# Patient Record
Sex: FEMALE | Race: WHITE | NOT HISPANIC OR LATINO
[De-identification: names, ages, dates, MRNs, and addresses within clinical notes are randomized per-mention and may not be internally consistent; named-entity substitution may affect disease eponyms.]

---

## 2019-11-22 ENCOUNTER — APPOINTMENT (OUTPATIENT)
Dept: ORTHOPEDIC SURGERY | Facility: CLINIC | Age: 79
End: 2019-11-22
Payer: MEDICARE

## 2019-11-22 VITALS
DIASTOLIC BLOOD PRESSURE: 74 MMHG | HEART RATE: 81 BPM | WEIGHT: 155 LBS | BODY MASS INDEX: 27.46 KG/M2 | HEIGHT: 63 IN | TEMPERATURE: 98.2 F | SYSTOLIC BLOOD PRESSURE: 148 MMHG

## 2019-11-22 DIAGNOSIS — Z78.9 OTHER SPECIFIED HEALTH STATUS: ICD-10-CM

## 2019-11-22 PROCEDURE — 73030 X-RAY EXAM OF SHOULDER: CPT | Mod: 26,RT

## 2019-11-22 PROCEDURE — 99203 OFFICE O/P NEW LOW 30 MIN: CPT

## 2019-11-27 NOTE — HISTORY OF PRESENT ILLNESS
[de-identified] : The patient comes in today with complaints of right shoulder pain.  The patient states it has been going on for quite a while, but now she just had a big move from one house to another and she has overused it.  The patient states the pain is sharp.  The patient describes the pain as constant. [6] : a current pain level of 6/10 [de-identified] : Using arm to lift items [7] : the ailment interference is 7/10 [] : No

## 2019-11-27 NOTE — DISCUSSION/SUMMARY
[de-identified] : At this time, due to right shoulder bursitis, I recommended ice and elevation.  The patient will return back to the office in 2 weeks.\par

## 2019-11-27 NOTE — PHYSICAL EXAM
[Normal] : Gait: normal [de-identified] : Right Shoulder:  \par Shoulder: Range of Motion in Degrees:	\par 	                                  Claimant:	Normal:	\par Abduction (Active)	                  180	               180 degrees	\par Abduction (Passive)	  180	               180 degrees	\par Forward elevation (Active):	  180	               180 degrees	\par Forward elevation (Passive):	  180	               180 degrees	\par External rotation (Active):	   45	               45 degrees	\par External rotation (Passive):	   45	               45 degrees	\par Internal rotation (Active):	   L-1	               L-1	\par Internal rotation (Passive):	   L-1	               L-1	\par  \par No motor weakness to internal rotation, external rotation or abduction in the scapular plane.  Negative crank test.  Negative O’Jr’s test.  Negative Speed’s test. Negative Yergason’s test.  Negative cross arm test.  No tenderness to palpation at the AC joint. Positive Hawkin’s sign.  Positive Neer’s sign. Negative apprehension. Negative sulcus sign.  No gross neurological or vascular deficits distally.  Skin is intact.  No rashes, scars or lesions. 2+ radial and ulnar pulses. No extra-articular swelling or tenderness.\par \par Left Shoulder: \par Shoulder: Range of Motion in Degrees:   	\par    	                        Claimant:  	Normal:  	\par Abduction (Active)  	180  	180 degrees  	\par Abduction (Passive)  	180  	180 degrees  	\par Forward elevation (Active):  	180  	180 degrees  	\par Forward elevation (Passive):  180  	180 degrees  	\par External rotation (Active):  	45  	45 degrees  	\par External rotation (Passive):  	45  	45 degrees  	\par Internal rotation (Active):  	L-1  	L-1  	\par Internal rotation (Passive):  	L-1  	L-1  	\par \par No motor weakness to internal rotation, external rotation or abduction in the scapular plane.  Negative crank test.  Negative O’Jr’s test.  Negative Speed’s test. Negative Yergason’s test.  Negative cross arm test.  No tenderness to palpation at the AC joint. Negative Hawkin’s sign.  Negative Neer’s sign.  Negative apprehension. Negative sulcus sign.  No gross neurological or vascular deficits distally.  Skin is intact.  No rashes, scars or lesions.  2+ radial and ulnar pulses. No extra-articular swelling or tenderness.\par   [de-identified] : Appearance:  Well developed, well-nourished female in no acute distress.\par   [de-identified] : Radiographs, two views of the right shoulder, no acute fractures or dislocations.\par

## 2019-11-27 NOTE — REVIEW OF SYSTEMS
[Joint Pain] : joint pain [Cough] : cough [Heartburn] : heartburn [Urinary Frequency] : urinary frequency [Incontinence] : incontinence [Negative] : Heme/Lymph

## 2019-11-27 NOTE — ADDENDUM
[FreeTextEntry1] : This note was written by Cortez Vincent on 11/27/2019, acting as a scribe for BRANDON QUIÑONEZ, TAMMIE/JUAN MANUEL PA

## 2019-12-05 PROBLEM — M75.51 BURSITIS OF RIGHT SHOULDER: Status: ACTIVE | Noted: 2019-11-27

## 2019-12-06 ENCOUNTER — APPOINTMENT (OUTPATIENT)
Dept: ORTHOPEDIC SURGERY | Facility: CLINIC | Age: 79
End: 2019-12-06
Payer: MEDICARE

## 2019-12-06 VITALS
DIASTOLIC BLOOD PRESSURE: 80 MMHG | BODY MASS INDEX: 28 KG/M2 | HEIGHT: 63 IN | HEART RATE: 84 BPM | TEMPERATURE: 98.2 F | SYSTOLIC BLOOD PRESSURE: 151 MMHG | WEIGHT: 158 LBS

## 2019-12-06 DIAGNOSIS — M75.51 BURSITIS OF RIGHT SHOULDER: ICD-10-CM

## 2019-12-06 PROCEDURE — 99212 OFFICE O/P EST SF 10 MIN: CPT

## 2019-12-10 NOTE — ADDENDUM
[FreeTextEntry1] : This note was written by Zuly Gramajo on 12/10/2019 acting as scribe for Azalea Velasco, JENNIFERR/JUAN MANUEL, PA.\par

## 2019-12-10 NOTE — PHYSICAL EXAM
[Normal] : Gait: normal [de-identified] : Right shoulder:\par Shoulder: Range of Motion in Degrees:   	\par    	                        Claimant:          Normal:  	\par Abduction (Active)  	180  	180 degrees  	\par Abduction (Passive)  	180  	180 degrees  	\par Forward elevation (Active):  	180  	180 degrees  	\par Forward elevation (Passive):  180  	180 degrees  	\par External rotation (Active):  	45  	45 degrees  	\par External rotation (Passive):  	45  	45 degrees  	\par Internal rotation (Active):  	L-1  	L-1  	\par Internal rotation (Passive):  	L-1  	L-1  	\par \par No motor weakness to internal rotation, external rotation or abduction in the scapular plane.  Negative crank test.  Negative O’Jr’s test.  Negative Speed’s test. Negative Yergason’s test.  Negative cross arm test.  No tenderness to palpation at the AC joint. Negative Hawkin’s sign.  Negative Neer’s sign.  Negative apprehension. Negative sulcus sign.  No gross neurological or vascular deficits distally.  Skin is intact.  No rashes, scars or lesions.  2+ radial and ulnar pulses. No extra-articular swelling or tenderness.  [de-identified] : Station: normal [de-identified] : Appearance: Well-developed, well-nourished female  in no acute distress.

## 2019-12-10 NOTE — DISCUSSION/SUMMARY
[de-identified] : The patient presents with impingement, right shoulder (resolved).  The patient is just going to do some physical therapy at this time just to get the strength back.  She will return to the office as needed. \par \par The patient has been advised that their blood pressure today was outside normal ranges and the patient was instructed accordingly. Our Blood Pressure Monitoring Sheet with instructions was given to the patient.\par

## 2019-12-10 NOTE — HISTORY OF PRESENT ILLNESS
[de-identified] : Mary Grace comes in today with bursitis of the right shoulder.   The patient states that she had the injection two weeks ago and she is feeling excellent.

## 2021-07-20 DIAGNOSIS — M54.2 CERVICALGIA: ICD-10-CM

## 2021-08-04 ENCOUNTER — TRANSCRIPTION ENCOUNTER (OUTPATIENT)
Age: 81
End: 2021-08-04

## 2021-08-09 ENCOUNTER — EMERGENCY (EMERGENCY)
Facility: HOSPITAL | Age: 81
LOS: 0 days | Discharge: HOME | End: 2021-08-09
Attending: EMERGENCY MEDICINE
Payer: MEDICARE

## 2021-08-09 VITALS — HEIGHT: 63 IN | WEIGHT: 175.05 LBS

## 2021-08-09 VITALS
DIASTOLIC BLOOD PRESSURE: 92 MMHG | RESPIRATION RATE: 16 BRPM | TEMPERATURE: 98 F | HEART RATE: 68 BPM | SYSTOLIC BLOOD PRESSURE: 154 MMHG | OXYGEN SATURATION: 98 %

## 2021-08-09 DIAGNOSIS — Z88.6 ALLERGY STATUS TO ANALGESIC AGENT: ICD-10-CM

## 2021-08-09 DIAGNOSIS — R10.9 UNSPECIFIED ABDOMINAL PAIN: ICD-10-CM

## 2021-08-09 DIAGNOSIS — N20.0 CALCULUS OF KIDNEY: ICD-10-CM

## 2021-08-09 DIAGNOSIS — Z88.1 ALLERGY STATUS TO OTHER ANTIBIOTIC AGENTS STATUS: ICD-10-CM

## 2021-08-09 LAB
ALBUMIN SERPL ELPH-MCNC: 4 G/DL — SIGNIFICANT CHANGE UP (ref 3.3–5)
ALP SERPL-CCNC: 95 U/L — SIGNIFICANT CHANGE UP (ref 40–120)
ALT FLD-CCNC: 32 U/L — SIGNIFICANT CHANGE UP (ref 12–78)
ANION GAP SERPL CALC-SCNC: 4 MMOL/L — LOW (ref 5–17)
APPEARANCE UR: CLEAR — SIGNIFICANT CHANGE UP
AST SERPL-CCNC: 24 U/L — SIGNIFICANT CHANGE UP (ref 15–37)
BASOPHILS # BLD AUTO: 0.04 K/UL — SIGNIFICANT CHANGE UP (ref 0–0.2)
BASOPHILS NFR BLD AUTO: 0.7 % — SIGNIFICANT CHANGE UP (ref 0–2)
BILIRUB SERPL-MCNC: 0.7 MG/DL — SIGNIFICANT CHANGE UP (ref 0.2–1.2)
BILIRUB UR-MCNC: NEGATIVE — SIGNIFICANT CHANGE UP
BUN SERPL-MCNC: 13 MG/DL — SIGNIFICANT CHANGE UP (ref 7–23)
CALCIUM SERPL-MCNC: 9.5 MG/DL — SIGNIFICANT CHANGE UP (ref 8.5–10.1)
CHLORIDE SERPL-SCNC: 107 MMOL/L — SIGNIFICANT CHANGE UP (ref 96–108)
CO2 SERPL-SCNC: 28 MMOL/L — SIGNIFICANT CHANGE UP (ref 22–31)
COLOR SPEC: YELLOW — SIGNIFICANT CHANGE UP
CREAT SERPL-MCNC: 0.72 MG/DL — SIGNIFICANT CHANGE UP (ref 0.5–1.3)
DIFF PNL FLD: NEGATIVE — SIGNIFICANT CHANGE UP
EOSINOPHIL # BLD AUTO: 0.22 K/UL — SIGNIFICANT CHANGE UP (ref 0–0.5)
EOSINOPHIL NFR BLD AUTO: 3.6 % — SIGNIFICANT CHANGE UP (ref 0–6)
GLUCOSE SERPL-MCNC: 93 MG/DL — SIGNIFICANT CHANGE UP (ref 70–99)
GLUCOSE UR QL: NEGATIVE MG/DL — SIGNIFICANT CHANGE UP
HCT VFR BLD CALC: 41.3 % — SIGNIFICANT CHANGE UP (ref 34.5–45)
HGB BLD-MCNC: 13.9 G/DL — SIGNIFICANT CHANGE UP (ref 11.5–15.5)
IMM GRANULOCYTES NFR BLD AUTO: 0.3 % — SIGNIFICANT CHANGE UP (ref 0–1.5)
KETONES UR-MCNC: NEGATIVE — SIGNIFICANT CHANGE UP
LEUKOCYTE ESTERASE UR-ACNC: NEGATIVE — SIGNIFICANT CHANGE UP
LIDOCAIN IGE QN: 186 U/L — SIGNIFICANT CHANGE UP (ref 73–393)
LYMPHOCYTES # BLD AUTO: 1.79 K/UL — SIGNIFICANT CHANGE UP (ref 1–3.3)
LYMPHOCYTES # BLD AUTO: 29.2 % — SIGNIFICANT CHANGE UP (ref 13–44)
MCHC RBC-ENTMCNC: 32 PG — SIGNIFICANT CHANGE UP (ref 27–34)
MCHC RBC-ENTMCNC: 33.7 GM/DL — SIGNIFICANT CHANGE UP (ref 32–36)
MCV RBC AUTO: 94.9 FL — SIGNIFICANT CHANGE UP (ref 80–100)
MONOCYTES # BLD AUTO: 0.68 K/UL — SIGNIFICANT CHANGE UP (ref 0–0.9)
MONOCYTES NFR BLD AUTO: 11.1 % — SIGNIFICANT CHANGE UP (ref 2–14)
NEUTROPHILS # BLD AUTO: 3.37 K/UL — SIGNIFICANT CHANGE UP (ref 1.8–7.4)
NEUTROPHILS NFR BLD AUTO: 55.1 % — SIGNIFICANT CHANGE UP (ref 43–77)
NITRITE UR-MCNC: NEGATIVE — SIGNIFICANT CHANGE UP
PH UR: 6 — SIGNIFICANT CHANGE UP (ref 5–8)
PLATELET # BLD AUTO: 319 K/UL — SIGNIFICANT CHANGE UP (ref 150–400)
POTASSIUM SERPL-MCNC: 3.9 MMOL/L — SIGNIFICANT CHANGE UP (ref 3.5–5.3)
POTASSIUM SERPL-SCNC: 3.9 MMOL/L — SIGNIFICANT CHANGE UP (ref 3.5–5.3)
PROT SERPL-MCNC: 7.6 GM/DL — SIGNIFICANT CHANGE UP (ref 6–8.3)
PROT UR-MCNC: NEGATIVE MG/DL — SIGNIFICANT CHANGE UP
RBC # BLD: 4.35 M/UL — SIGNIFICANT CHANGE UP (ref 3.8–5.2)
RBC # FLD: 12.7 % — SIGNIFICANT CHANGE UP (ref 10.3–14.5)
SODIUM SERPL-SCNC: 139 MMOL/L — SIGNIFICANT CHANGE UP (ref 135–145)
SP GR SPEC: 1.01 — SIGNIFICANT CHANGE UP (ref 1.01–1.02)
UROBILINOGEN FLD QL: NEGATIVE MG/DL — SIGNIFICANT CHANGE UP
WBC # BLD: 6.12 K/UL — SIGNIFICANT CHANGE UP (ref 3.8–10.5)
WBC # FLD AUTO: 6.12 K/UL — SIGNIFICANT CHANGE UP (ref 3.8–10.5)

## 2021-08-09 PROCEDURE — 83690 ASSAY OF LIPASE: CPT

## 2021-08-09 PROCEDURE — 99284 EMERGENCY DEPT VISIT MOD MDM: CPT

## 2021-08-09 PROCEDURE — G1004: CPT

## 2021-08-09 PROCEDURE — 85025 COMPLETE CBC W/AUTO DIFF WBC: CPT

## 2021-08-09 PROCEDURE — 74176 CT ABD & PELVIS W/O CONTRAST: CPT

## 2021-08-09 PROCEDURE — 74176 CT ABD & PELVIS W/O CONTRAST: CPT | Mod: 26,ME

## 2021-08-09 PROCEDURE — 81003 URINALYSIS AUTO W/O SCOPE: CPT

## 2021-08-09 PROCEDURE — 36415 COLL VENOUS BLD VENIPUNCTURE: CPT

## 2021-08-09 PROCEDURE — 87086 URINE CULTURE/COLONY COUNT: CPT

## 2021-08-09 PROCEDURE — 80053 COMPREHEN METABOLIC PANEL: CPT

## 2021-08-09 PROCEDURE — 99284 EMERGENCY DEPT VISIT MOD MDM: CPT | Mod: 25

## 2021-08-09 RX ORDER — TAMSULOSIN HYDROCHLORIDE 0.4 MG/1
1 CAPSULE ORAL
Qty: 7 | Refills: 0
Start: 2021-08-09 | End: 2021-08-15

## 2021-08-09 RX ORDER — SODIUM CHLORIDE 9 MG/ML
1000 INJECTION INTRAMUSCULAR; INTRAVENOUS; SUBCUTANEOUS ONCE
Refills: 0 | Status: COMPLETED | OUTPATIENT
Start: 2021-08-09 | End: 2021-08-09

## 2021-08-09 RX ADMIN — SODIUM CHLORIDE 1000 MILLILITER(S): 9 INJECTION INTRAMUSCULAR; INTRAVENOUS; SUBCUTANEOUS at 11:49

## 2021-08-09 NOTE — ED ADULT TRIAGE NOTE - CHIEF COMPLAINT QUOTE
pt presents to Ed with complaints of right flank pain since yesterday. pt endorses hx of kidney stones and states it feels the same as previous ones.

## 2021-08-09 NOTE — ED STATDOCS - CLINICAL SUMMARY MEDICAL DECISION MAKING FREE TEXT BOX
pt with hx of renal colic and presents with R flank pain starting today. check labs, urine and CT to make diagnosis of renal colic.

## 2021-08-09 NOTE — ED STATDOCS - CARE PROVIDER_API CALL
Andrei Ruano)  Urology  [address], [address] [place] [address]  Phone: [phone]  Fax: [phone]  Follow Up Time:

## 2021-08-09 NOTE — ED ADULT NURSE NOTE - OBJECTIVE STATEMENT
Pt reports that she has been having right flank pain worsening over past day. Pt reports hx of kidney stones in past.

## 2021-08-09 NOTE — ED STATDOCS - PROGRESS NOTE DETAILS
82 yo female with a PMH of kidney stones 5 years ago presents with R sided flank pain radiating to the R abdomen since last night. Pt states the symptoms feel similar to her prior kidney stones. Pt had some dysuria 1 week ago, seen at urgent care, had a negative UA, and was d/c home on abx for a sinus infection. Dysuria has resolved prior to her back pain. Will check labs, UA, CT, reeval. -Jose Abrams PA-C Labs and UA unremarkable. CT showing 1 mm stone. WIll d/c with flomax and give strainer with urology f/u. -Jose Abrams PA-C

## 2021-08-09 NOTE — ED STATDOCS - PATIENT PORTAL LINK FT
You can access the FollowMyHealth Patient Portal offered by [facility] by registering at the following website: [email or web address]. By joining [employer]’s FollowMyHealth portal, you will also be able to view your health information using other applications (apps) compatible with our system.

## 2021-08-09 NOTE — ED STATDOCS - OBJECTIVE STATEMENT
80 y/o F with a PMHx of kidney stones presents to the ED c/o R flank pain x yesterday and the pain subsided. Woke up today and states the pain returned but is worsened today which prompted her ED visit. Pt states the pain feels similar to when she has had kidney stones prior. Pt states she is currently being treated for a sinus infection and fluid in her lungs. c/o dysuria which self resolved. Denies N/V. Pt went to [facility] last week for a sinus infection. last No OP urologist. Non smoker. COVID vaccinated.

## 2021-08-10 LAB
CULTURE RESULTS: SIGNIFICANT CHANGE UP
SPECIMEN SOURCE: SIGNIFICANT CHANGE UP

## 2021-08-12 PROBLEM — N20.0 CALCULUS OF KIDNEY: Chronic | Status: ACTIVE | Noted: 2021-08-09

## 2021-08-19 ENCOUNTER — APPOINTMENT (OUTPATIENT)
Dept: UROLOGY | Facility: CLINIC | Age: 81
End: 2021-08-19

## 2021-08-23 ENCOUNTER — APPOINTMENT (OUTPATIENT)
Dept: UROLOGY | Facility: CLINIC | Age: 81
End: 2021-08-23
Payer: MEDICARE

## 2021-08-23 VITALS
BODY MASS INDEX: 28 KG/M2 | HEIGHT: 63 IN | HEART RATE: 77 BPM | SYSTOLIC BLOOD PRESSURE: 134 MMHG | OXYGEN SATURATION: 96 % | TEMPERATURE: 98.2 F | DIASTOLIC BLOOD PRESSURE: 82 MMHG | WEIGHT: 158 LBS | RESPIRATION RATE: 16 BRPM

## 2021-08-23 DIAGNOSIS — M54.9 DORSALGIA, UNSPECIFIED: ICD-10-CM

## 2021-08-23 PROCEDURE — 99204 OFFICE O/P NEW MOD 45 MIN: CPT

## 2021-08-23 NOTE — PHYSICAL EXAM
[General Appearance - Well Developed] : well developed [General Appearance - Well Nourished] : well nourished [Normal Appearance] : normal appearance [Well Groomed] : well groomed [General Appearance - In No Acute Distress] : no acute distress [Edema] : no peripheral edema [Respiration, Rhythm And Depth] : normal respiratory rhythm and effort [Exaggerated Use Of Accessory Muscles For Inspiration] : no accessory muscle use [Abdomen Soft] : soft [Abdomen Tenderness] : non-tender [Abdomen Mass (___ Cm)] : no abdominal mass palpated [Abdomen Hernia] : no hernia was discovered [Costovertebral Angle Tenderness] : no ~M costovertebral angle tenderness [Urethral Meatus] : normal urethra [External Female Genitalia] : normal external genitalia [Vagina] : normal vaginal exam [Normal Station and Gait] : the gait and station were normal for the patient's age [FreeTextEntry1] : tenderness in back right upper - paraspinous [] : no rash [No Focal Deficits] : no focal deficits [Oriented To Time, Place, And Person] : oriented to person, place, and time [Affect] : the affect was normal [Mood] : the mood was normal [Not Anxious] : not anxious [Cervical Lymph Nodes Enlarged Posterior Bilaterally] : posterior cervical [Cervical Lymph Nodes Enlarged Anterior Bilaterally] : anterior cervical [Supraclavicular Lymph Nodes Enlarged Bilaterally] : supraclavicular

## 2021-08-23 NOTE — REVIEW OF SYSTEMS
[Eyesight Problems] : eyesight problems [Cough] : cough [Constipation] : constipation [Date of last menstrual period ____] : date of last menstrual period: [unfilled] [History of kidney stones] : history of kidney stones [Wake up at night to urinate  How many times?  ___] : wakes up to urinate [unfilled] times during the night [Strong urge to urinate] : strong urge to urinate [Negative] : Heme/Lymph [Recent Weight Loss (___ Lbs)] : recent [unfilled] ~Ulb weight loss [Heartburn] : heartburn [see HPI] : see HPI [Leakage of urine with straining, coughing, laughing] : leakage of urine with straining, coughing, laughing

## 2021-08-23 NOTE — HISTORY OF PRESENT ILLNESS
[FreeTextEntry1] : patient with 2 year hx of inc rafael fontana , jing poise pads-- 6 /day \par no dysuria or hematurai or INC\par good flow and feels empty after void \par no meds tired \par not sexually active\par ++ water intake and bowel OK \par chronic cough since getting second COVID vaccine shot

## 2021-08-23 NOTE — ASSESSMENT
[FreeTextEntry1] : right back pan \par images of ct scan discussed\par right LP complex cyst - simple - 5-6 cm \par right mp 1.7 cmplex cyst -- rec repeat KAYLA 6m --referral given \par 1 mm stone right LP - not likley cause of back pioan with NO right PN strandng of kidney to suggest passage of stone \par patient feels better when restng on back \par ? sxs related to spinal process--hx of osteopenia -? compresion fx\par seen in [facility] for couhg , luts ( inc ) and righ back pain \par labs showed normal white count 6, creat 0.7 and neg urine \par as for INC for past 2 years- discussed meds--she declines\par agrees to UDS- jennifer scheduled

## 2021-08-28 ENCOUNTER — NON-APPOINTMENT (OUTPATIENT)
Age: 81
End: 2021-08-28

## 2021-08-28 ENCOUNTER — EMERGENCY (EMERGENCY)
Facility: HOSPITAL | Age: 81
LOS: 0 days | Discharge: HOME | End: 2021-08-28
Attending: EMERGENCY MEDICINE
Payer: MEDICARE

## 2021-08-28 VITALS
SYSTOLIC BLOOD PRESSURE: 141 MMHG | DIASTOLIC BLOOD PRESSURE: 97 MMHG | TEMPERATURE: 99 F | HEART RATE: 92 BPM | OXYGEN SATURATION: 97 % | RESPIRATION RATE: 18 BRPM

## 2021-08-28 VITALS — WEIGHT: 175.05 LBS | HEIGHT: 63 IN

## 2021-08-28 DIAGNOSIS — Z72.89 OTHER PROBLEMS RELATED TO LIFESTYLE: ICD-10-CM

## 2021-08-28 DIAGNOSIS — Z88.1 ALLERGY STATUS TO OTHER ANTIBIOTIC AGENTS STATUS: ICD-10-CM

## 2021-08-28 DIAGNOSIS — M54.9 DORSALGIA, UNSPECIFIED: ICD-10-CM

## 2021-08-28 DIAGNOSIS — Z87.442 PERSONAL HISTORY OF URINARY CALCULI: ICD-10-CM

## 2021-08-28 DIAGNOSIS — M54.6 PAIN IN THORACIC SPINE: ICD-10-CM

## 2021-08-28 DIAGNOSIS — Z88.6 ALLERGY STATUS TO ANALGESIC AGENT: ICD-10-CM

## 2021-08-28 DIAGNOSIS — R10.9 UNSPECIFIED ABDOMINAL PAIN: ICD-10-CM

## 2021-08-28 DIAGNOSIS — K59.00 CONSTIPATION, UNSPECIFIED: ICD-10-CM

## 2021-08-28 DIAGNOSIS — K62.5 HEMORRHAGE OF ANUS AND RECTUM: ICD-10-CM

## 2021-08-28 DIAGNOSIS — K59.03 DRUG INDUCED CONSTIPATION: ICD-10-CM

## 2021-08-28 DIAGNOSIS — G89.29 OTHER CHRONIC PAIN: ICD-10-CM

## 2021-08-28 DIAGNOSIS — R01.1 CARDIAC MURMUR, UNSPECIFIED: ICD-10-CM

## 2021-08-28 LAB
ALBUMIN SERPL ELPH-MCNC: 3.9 G/DL — SIGNIFICANT CHANGE UP (ref 3.3–5)
ALP SERPL-CCNC: 91 U/L — SIGNIFICANT CHANGE UP (ref 40–120)
ALT FLD-CCNC: 30 U/L — SIGNIFICANT CHANGE UP (ref 12–78)
ANION GAP SERPL CALC-SCNC: 3 MMOL/L — LOW (ref 5–17)
APPEARANCE UR: CLEAR — SIGNIFICANT CHANGE UP
AST SERPL-CCNC: 21 U/L — SIGNIFICANT CHANGE UP (ref 15–37)
BASOPHILS # BLD AUTO: 0.04 K/UL — SIGNIFICANT CHANGE UP (ref 0–0.2)
BASOPHILS NFR BLD AUTO: 0.6 % — SIGNIFICANT CHANGE UP (ref 0–2)
BILIRUB SERPL-MCNC: 0.4 MG/DL — SIGNIFICANT CHANGE UP (ref 0.2–1.2)
BILIRUB UR-MCNC: NEGATIVE — SIGNIFICANT CHANGE UP
BUN SERPL-MCNC: 10 MG/DL — SIGNIFICANT CHANGE UP (ref 7–23)
CALCIUM SERPL-MCNC: 9.1 MG/DL — SIGNIFICANT CHANGE UP (ref 8.5–10.1)
CHLORIDE SERPL-SCNC: 107 MMOL/L — SIGNIFICANT CHANGE UP (ref 96–108)
CO2 SERPL-SCNC: 27 MMOL/L — SIGNIFICANT CHANGE UP (ref 22–31)
COLOR SPEC: YELLOW — SIGNIFICANT CHANGE UP
CREAT SERPL-MCNC: 0.78 MG/DL — SIGNIFICANT CHANGE UP (ref 0.5–1.3)
DIFF PNL FLD: NEGATIVE — SIGNIFICANT CHANGE UP
EOSINOPHIL # BLD AUTO: 0.75 K/UL — HIGH (ref 0–0.5)
EOSINOPHIL NFR BLD AUTO: 10.4 % — HIGH (ref 0–6)
GLUCOSE SERPL-MCNC: 67 MG/DL — LOW (ref 70–99)
GLUCOSE UR QL: NEGATIVE MG/DL — SIGNIFICANT CHANGE UP
HCT VFR BLD CALC: 41.1 % — SIGNIFICANT CHANGE UP (ref 34.5–45)
HGB BLD-MCNC: 13.6 G/DL — SIGNIFICANT CHANGE UP (ref 11.5–15.5)
IMM GRANULOCYTES NFR BLD AUTO: 0.4 % — SIGNIFICANT CHANGE UP (ref 0–1.5)
KETONES UR-MCNC: NEGATIVE — SIGNIFICANT CHANGE UP
LACTATE SERPL-SCNC: 1.6 MMOL/L — SIGNIFICANT CHANGE UP (ref 0.7–2)
LEUKOCYTE ESTERASE UR-ACNC: NEGATIVE — SIGNIFICANT CHANGE UP
LIDOCAIN IGE QN: 186 U/L — SIGNIFICANT CHANGE UP (ref 73–393)
LYMPHOCYTES # BLD AUTO: 2.12 K/UL — SIGNIFICANT CHANGE UP (ref 1–3.3)
LYMPHOCYTES # BLD AUTO: 29.4 % — SIGNIFICANT CHANGE UP (ref 13–44)
MCHC RBC-ENTMCNC: 31.4 PG — SIGNIFICANT CHANGE UP (ref 27–34)
MCHC RBC-ENTMCNC: 33.1 GM/DL — SIGNIFICANT CHANGE UP (ref 32–36)
MCV RBC AUTO: 94.9 FL — SIGNIFICANT CHANGE UP (ref 80–100)
MONOCYTES # BLD AUTO: 0.9 K/UL — SIGNIFICANT CHANGE UP (ref 0–0.9)
MONOCYTES NFR BLD AUTO: 12.5 % — SIGNIFICANT CHANGE UP (ref 2–14)
NEUTROPHILS # BLD AUTO: 3.36 K/UL — SIGNIFICANT CHANGE UP (ref 1.8–7.4)
NEUTROPHILS NFR BLD AUTO: 46.7 % — SIGNIFICANT CHANGE UP (ref 43–77)
NITRITE UR-MCNC: NEGATIVE — SIGNIFICANT CHANGE UP
PH UR: 7 — SIGNIFICANT CHANGE UP (ref 5–8)
PLATELET # BLD AUTO: 301 K/UL — SIGNIFICANT CHANGE UP (ref 150–400)
POTASSIUM SERPL-MCNC: 4.1 MMOL/L — SIGNIFICANT CHANGE UP (ref 3.5–5.3)
POTASSIUM SERPL-SCNC: 4.1 MMOL/L — SIGNIFICANT CHANGE UP (ref 3.5–5.3)
PROT SERPL-MCNC: 7.6 GM/DL — SIGNIFICANT CHANGE UP (ref 6–8.3)
PROT UR-MCNC: NEGATIVE MG/DL — SIGNIFICANT CHANGE UP
RBC # BLD: 4.33 M/UL — SIGNIFICANT CHANGE UP (ref 3.8–5.2)
RBC # FLD: 12.6 % — SIGNIFICANT CHANGE UP (ref 10.3–14.5)
SODIUM SERPL-SCNC: 137 MMOL/L — SIGNIFICANT CHANGE UP (ref 135–145)
SP GR SPEC: 1 — LOW (ref 1.01–1.02)
TROPONIN I SERPL-MCNC: <0.015 NG/ML — SIGNIFICANT CHANGE UP (ref 0.01–0.04)
UROBILINOGEN FLD QL: NEGATIVE MG/DL — SIGNIFICANT CHANGE UP
WBC # BLD: 7.2 K/UL — SIGNIFICANT CHANGE UP (ref 3.8–10.5)
WBC # FLD AUTO: 7.2 K/UL — SIGNIFICANT CHANGE UP (ref 3.8–10.5)

## 2021-08-28 PROCEDURE — 71045 X-RAY EXAM CHEST 1 VIEW: CPT | Mod: 26

## 2021-08-28 PROCEDURE — 80053 COMPREHEN METABOLIC PANEL: CPT

## 2021-08-28 PROCEDURE — 85025 COMPLETE CBC W/AUTO DIFF WBC: CPT

## 2021-08-28 PROCEDURE — 83690 ASSAY OF LIPASE: CPT

## 2021-08-28 PROCEDURE — 83605 ASSAY OF LACTIC ACID: CPT

## 2021-08-28 PROCEDURE — 99284 EMERGENCY DEPT VISIT MOD MDM: CPT

## 2021-08-28 PROCEDURE — 84484 ASSAY OF TROPONIN QUANT: CPT

## 2021-08-28 PROCEDURE — 71045 X-RAY EXAM CHEST 1 VIEW: CPT

## 2021-08-28 PROCEDURE — 36415 COLL VENOUS BLD VENIPUNCTURE: CPT

## 2021-08-28 PROCEDURE — 96360 HYDRATION IV INFUSION INIT: CPT | Mod: XU

## 2021-08-28 PROCEDURE — 99284 EMERGENCY DEPT VISIT MOD MDM: CPT | Mod: 25

## 2021-08-28 PROCEDURE — 74177 CT ABD & PELVIS W/CONTRAST: CPT

## 2021-08-28 PROCEDURE — 87086 URINE CULTURE/COLONY COUNT: CPT

## 2021-08-28 PROCEDURE — 74177 CT ABD & PELVIS W/CONTRAST: CPT | Mod: 26,MA

## 2021-08-28 PROCEDURE — 81003 URINALYSIS AUTO W/O SCOPE: CPT

## 2021-08-28 RX ORDER — SODIUM CHLORIDE 9 MG/ML
1000 INJECTION INTRAMUSCULAR; INTRAVENOUS; SUBCUTANEOUS ONCE
Refills: 0 | Status: COMPLETED | OUTPATIENT
Start: 2021-08-28 | End: 2021-08-28

## 2021-08-28 RX ADMIN — SODIUM CHLORIDE 1000 MILLILITER(S): 9 INJECTION INTRAMUSCULAR; INTRAVENOUS; SUBCUTANEOUS at 18:24

## 2021-08-28 RX ADMIN — SODIUM CHLORIDE 1000 MILLILITER(S): 9 INJECTION INTRAMUSCULAR; INTRAVENOUS; SUBCUTANEOUS at 19:25

## 2021-08-28 NOTE — ED STATDOCS - PROGRESS NOTE DETAILS
signed Kym Brantley PA-C Pt seen initially in intake by Dr. Aguiar.   81F c/o abd pain, constipation x 1 week. pt is having some abd distention and notes her stools are soft and thin. +flatus. Mild nausea, no vomiting or fever. abdomen soft, non-tender. REctal exam soft brown stool, guiac negative Lot [id number] 2/28/22 QC pass. Exam chaperoned by Kenia. Plan labs, UA, CT. Pt agrees with plan of  care. No PMD or GI. PMH GERD. not on anticoagulation. signed Kym Brantley PA-C   Labs notable for mild hypoglycemia. Pt alert, NAD. pleasant, conversant and smiling. Pt given crackers in ED. No significant findings on CT. Pt plans to go home and try enema. Also recommend OTC stool softeners or laxatives. return precautions given. outpt f/u GI and PMD (pt has neither). Pt feeling well at DC, agrees with DC and plan of care. Pt ambulates with ease unassisted in ED.

## 2021-08-28 NOTE — ED STATDOCS - NS ED ROS FT
Review of Systems:  	•	CONSTITUTIONAL: no fever  	•	SKIN: no rash  	•	RESPIRATORY: no shortness of breath  	•	CARDIAC: no chest pain  	•	GI:  +abd pain, no nausea, no vomiting, no diarrhea  	•	GENITO-URINARY:  no dysuria, +constipation   	•	MUSCULOSKELETAL:  +back pain  	•	NEUROLOGIC: no weakness  	•	ALLERGY: no rhinorrhea  	•	PSYSCHIATRIC: appropriate concern about symptoms

## 2021-08-28 NOTE — ED ADULT TRIAGE NOTE - CHIEF COMPLAINT QUOTE
Pt ambulatory for constipation and right sided back pain. Pt report she was seen here on 8/9 and dx with kidney stone. Pt has been taking flomax since then and reports that's when symptoms began.

## 2021-08-28 NOTE — ED STATDOCS - ATTENDING CONTRIBUTION TO CARE
I, Siva Aguiar MD,  performed the initial face to face bedside interview with this patient regarding history of present illness, review of symptoms and relevant past medical, social and family history.  I completed an independent physical examination.  I was the initial provider who evaluated this patient. I have signed out the follow up of any pending tests (i.e. labs, radiological studies) to the ACP.  The ACP will complete the work up, interpret tests, administer medications if necessary and reassess the patient for an appropriate disposition.  If questions arise the ACP is expected to contact me for consultation. In the current work-flow I usually don't get to speak to nor reassess patients for final disposition once I sign the case out to the ACP (Unless otherwise specifically documented by me).

## 2021-08-28 NOTE — ED STATDOCS - PATIENT PORTAL LINK FT
You can access the FollowMyHealth Patient Portal offered by [facility] by registering at the following website: [email or web address]. By joining [employer]’s FollowMyHealth portal, you will also be able to view your health information using other applications (apps) compatible with our system.

## 2021-08-28 NOTE — ED STATDOCS - CLINICAL SUMMARY MEDICAL DECISION MAKING FREE TEXT BOX
chronic back 1 month likely muscular abd pain constipation x 2 weeks 1 episode of blood in toilet will guaiac exam CT

## 2021-08-28 NOTE — ED STATDOCS - CARE PROVIDERS DIRECT ADDRESSES
[email or web address]

## 2021-08-28 NOTE — ED STATDOCS - OBJECTIVE STATEMENT
Pertinent HPI/PMH/PSH/FHx/SHx and Review of Systems contained within  HPI:  Patient p/w CC new and worsening constipation, abd pain x 2 weeks ago and chronic R sided back pain x 1 month. Pt was 8/9 had CT showing 1mm stone in kidney d/c w/ Flomax. Pt f/u w/ urology who told pt pain is not due to kidney stone. Pt reports one episode of dark blood in stool. Pt is not on blood thinners.  PMH/PSH relevant for: Kidney stone  ROS negative for: fever, Chest pain, SOB, Nausea, diarrhea, abdominal pain, dysuria    FamilyHx and SocialHx not otherwise contributory

## 2021-08-28 NOTE — ED STATDOCS - NSFOLLOWUPINSTRUCTIONS_ED_ALL_ED_FT
Acute Abdominal Pain    WHAT YOU NEED TO KNOW:    The cause of your abdominal pain may not be found. If a cause is found, treatment will depend on what the cause is.     DISCHARGE INSTRUCTIONS:    Return to the emergency department if:     You vomit blood or cannot stop vomiting.      You have blood in your bowel movement or it looks like tar.       You have bleeding from your rectum.       Your abdomen is larger than usual, more painful, and hard.       You have severe pain in your abdomen.       You stop passing gas and having bowel movements.       You feel weak, dizzy, or faint.    Contact your healthcare provider if:     You have a fever.      You have new signs and symptoms.      Your symptoms do not get better with treatment.       You have questions or concerns about your condition or care.    Medicines may be given to decrease pain, treat an infection, and manage your symptoms. Take your medicine as directed. Call your healthcare provider if you think your medicine is not helping or if you have side effects. Tell him if you are allergic to any medicine. Keep a list of the medicines, vitamins, and herbs you take. Include the amounts, and when and why you take them. Bring the list or the pill bottles to follow-up visits. Carry your medicine list with you in case of an emergency.    Manage your symptoms:     Apply heat on your abdomen for 20 to 30 minutes every 2 hours for as many days as directed. Heat helps decrease pain and muscle spasms.       Manage your stress. Stress may cause abdominal pain. Your healthcare provider may recommend relaxation techniques and deep breathing exercises to help decrease your stress. Your healthcare provider may recommend you talk to someone about your stress or anxiety, such as a counselor or a trusted friend. Get plenty of sleep and exercise regularly.       Limit or do not drink alcohol. Alcohol can make your abdominal pain worse. Ask your healthcare provider if it is safe for you to drink alcohol. Also ask how much is safe for you to drink.       Do not smoke. Nicotine and other chemicals in cigarettes can damage your esophagus and stomach. Ask your healthcare provider for information if you currently smoke and need help to quit. E-cigarettes or smokeless tobacco still contain nicotine. Talk to your healthcare provider before you use these products.     Make changes to the food you eat as directed: Do not eat foods that cause abdominal pain or other symptoms. Eat small meals more often.     Eat more high-fiber foods if you are constipated. High-fiber foods include fruits, vegetables, whole-grain foods, and legumes.       Do not eat foods that cause gas if you have bloating. Examples include broccoli, cabbage, and cauliflower. Do not drink soda or carbonated drinks, because these may also cause gas.       Do not eat foods or drinks that contain sorbitol or fructose if you have diarrhea and bloating. Some examples are fruit juices, candy, jelly, and sugar-free gum.       Do not eat high-fat foods, such as fried foods, cheeseburgers, hot dogs, and desserts.      Limit or do not drink caffeine. Caffeine may make symptoms, such as heart burn or nausea, worse.       Drink plenty of liquids to prevent dehydration from diarrhea or vomiting. Ask your healthcare provider how much liquid to drink each day and which liquids are best for you.     Follow up with your healthcare provider as directed: Write down your questions so you remember to ask them during your visits.     Constipation    WHAT YOU NEED TO KNOW:    Constipation is when you have hard, dry bowel movements, or you go longer than usual between bowel movements.     DISCHARGE INSTRUCTIONS:    Return to the emergency department if:     You have blood in your bowel movements.      You have a fever and abdominal pain with the constipation.    Contact your healthcare provider if:     Your constipation gets worse.       You start to vomit.      You have questions or concerns about your condition or care.    Medicines:     Medicine such as a laxative may help relax and loosen your intestines to help you have a bowel movement. Your provider may recommend you only use laxatives for a short time. Long-term use may make your bowels dependent on the medicine.      Take your medicine as directed. Contact your healthcare provider if you think your medicine is not helping or if you have side effects. Tell him of her if you are allergic to any medicine. Keep a list of the medicines, vitamins, and herbs you take. Include the amounts, and when and why you take them. Bring the list or the pill bottles to follow-up visits. Carry your medicine list with you in case of an emergency.    Relieve constipation:     A suppository may be used to help soften your bowel movements. This may make them easier to pass. A suppository is guided into your rectum through your anus.Suppository for Constipation           An enema is liquid medicine used to clear bowel movement from your rectum. The medicine is put into your rectum through your anus.Enemas         Prevent constipation:     Drink liquids as directed. You may need to drink extra liquids to help soften and move your bowels. Ask how much liquid to drink each day and which liquids are best for you.       Eat high-fiber foods. This may help decrease constipation by adding bulk to your bowel movements. High-fiber foods include fruit, vegetables, whole-grain breads and cereals, and beans. Your healthcare provider or dietitian can help you create a high-fiber meal plan. Your provider may also recommend a fiber supplement if you cannot get enough fiber from food.            Exercise regularly. Regular physical activity can help stimulate your intestines. Ask which exercises are best for you.      Schedule a time each day to have a bowel movement. This may help train your body to have regular bowel movements. Bend forward while you are on the toilet to help move the bowel movement out. Sit on the toilet for at least 10 minutes, even if you do not have a bowel movement.     Follow up with your healthcare provider as directed: Write down your questions so you remember to ask them during your visits.     FOLLOW UP WITH A GASTROENTEROLOGIST AND A PRIMARY DOCTOR THIS WEEK. CALL THE OFFICE TO MAKE AN APPOINTMENT. RETURN TO ER FOR ANY WORSENING SYMPTOMS OR NEW CONCERNS.

## 2021-08-28 NOTE — ED STATDOCS - CARE PROVIDER_API CALL
Alex Cunha)  Gastroenterology; Internal Medicine  [address] [place] [address]  Phone: [phone]  Fax: [phone]  Follow Up Time:

## 2021-08-30 ENCOUNTER — APPOINTMENT (OUTPATIENT)
Dept: FAMILY MEDICINE | Facility: CLINIC | Age: 81
End: 2021-08-30
Payer: MEDICARE

## 2021-08-30 VITALS
WEIGHT: 178 LBS | TEMPERATURE: 97.8 F | DIASTOLIC BLOOD PRESSURE: 86 MMHG | HEIGHT: 63 IN | OXYGEN SATURATION: 97 % | SYSTOLIC BLOOD PRESSURE: 130 MMHG | BODY MASS INDEX: 31.54 KG/M2 | HEART RATE: 77 BPM

## 2021-08-30 PROBLEM — K59.03 DRUG-INDUCED CONSTIPATION: Status: ACTIVE | Noted: 2021-08-30

## 2021-08-30 LAB
CULTURE RESULTS: SIGNIFICANT CHANGE UP
SPECIMEN SOURCE: SIGNIFICANT CHANGE UP

## 2021-08-30 PROCEDURE — 99214 OFFICE O/P EST MOD 30 MIN: CPT

## 2021-08-30 RX ORDER — OMEPRAZOLE 20 MG/1
20 TABLET, DELAYED RELEASE ORAL
Refills: 0 | Status: ACTIVE | COMMUNITY

## 2021-08-30 RX ORDER — CETIRIZINE HYDROCHLORIDE 10 MG/1
10 TABLET, COATED ORAL
Refills: 0 | Status: DISCONTINUED | COMMUNITY
Start: 2021-08-28 | End: 2021-08-30

## 2021-08-30 NOTE — PLAN
[FreeTextEntry1] : We revd 8/28/21 ED labs and abd CT results today and scanned these into her chart. \par \par Recommend she use Mag citrate (1/2 bottle first and if not enough of a response then drink the other half of the bottle) today as well as continue colace and prunes/prune juice and walking/movement to try to get bowels back to usual normal levels. Can consider magnesium citrate 100-400 mg daily for regular use to help keep bowels on track if needed althouhg she notes that constipation is not usually an issue for her. \par \par Ok to use Miralax daily or prn. \par \par Flu vacc revd/recommended. \par \par Recommend supportive care measures incl heat, gentle stretches, Tylenol prn, topical salves, chair yoga etc. Also she will cont PT which she has been doing. Also getting her chronic cough under control should help muscle strain (likely cause of her back/flank pain) resolve. \par \par Revd Montelukast and she would like to give this a try. Will give it a few weeks to see if helps with cough. If needed can add back in nasal steroid spray as well if feels that that mihgt help. Envtal control measures revd as well. If cough is incr/new sxs or if cough not improving with these measures then she should f/u with ENT and/or GI to further eval chronic cough. \par \par She thinks GERD is not likely cause for her cough as she is already on daily PPI med for heartburn (and her GERD has manifested with heartburn as opposed to cough in the past). Disc ok to cont PPI med daily but also ok to do trial of QOD dosing to see if can get by with lower dose of med over time (better for bones etc). If cough worsens when she decreases to QOD dosing she should resume daily dosing. \par \par DEXA revd and recommended and she defers as she notes that she would not want to take meds for bones no matter what the DEXA results showed. She will try to maximize dietary ca+/D/exercise to keep bones healthy. \par \par Reviewed importance of good self care (eg meditation, yoga, adequate rest, regular exercise, magnesium, clean eating etc). She defers on SSRI med trial at this time but can let me know if she changes her mind at any point. She has supportive daughter/family and she will work to try to make sure she gets some time to herself daily/gets some social interactions and respite from her caregiving responsibilities. \par \par She has PE visit scheduled for 11/2021.

## 2021-08-30 NOTE — REVIEW OF SYSTEMS
[Fever] : no fever [Chills] : no chills [Fatigue] : no fatigue [Recent Change In Weight] : ~T no recent weight change [Nasal Discharge] : no nasal discharge [Sore Throat] : no sore throat [Chest Pain] : no chest pain [Postnasal Drip] : no postnasal drip [Palpitations] : no palpitations [Lower Ext Edema] : no lower extremity edema [Shortness Of Breath] : no shortness of breath [Dyspnea on Exertion] : no dyspnea on exertion [Abdominal Pain] : no abdominal pain [Nausea] : no nausea [Diarrhea] : diarrhea [Vomiting] : no vomiting [Dysuria] : no dysuria [Hematuria] : no hematuria [Skin Rash] : no skin rash [Unsteady Walking] : no ataxia [Anxiety] : no anxiety [Depression] : no depression [Easy Bleeding] : no easy bleeding [Easy Bruising] : no easy bruising [FreeTextEntry6] : see HPI [FreeTextEntry7] : GERD, well controlled on daily PPI med, recent constipation related to Flomax use, see HPI [FreeTextEntry9] : OA related joint pain and stiffness. 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

## 2021-08-30 NOTE — PHYSICAL EXAM
[de-identified] : overweight, nontoxic, able to sit comfortably throughout visit and to get on/off exam table and change positions with only minimal assistance,  [de-identified] : +hyperactive (but nl pitch) BS, soft NT abdomen [de-identified] : She has TTP over R lateral lower ribs/flank region in small area (no overlying skin changes or rash in this area)

## 2021-08-30 NOTE — HISTORY OF PRESENT ILLNESS
[FreeTextEntry8] : Ms. uCi is here for eval of right mid/upper back pain. On 8/9/21 had acute onset R mid back pain that felt similar to prior episode of kidney stone so went to ED. Had labs which were reportedly normal and had CT w/o IV contrast and showed 1 mm stone. Sent home with Flomax and hydration and told would pass stone on own. Told to see urol also. Took Flomax and drank a ton of water and got constipated from the flomax (took x 10 days and then stopped).\par \par She had eval with urologist Dr. Clark last week (I revd that note). Urol advised her is not the cause of her back pain and neither are the 2 right sided kidney cysts (5-6 cm simple and 1.7 cm complex); cysts will be followed up with urol with repeat imaging in several months. \par \par Went back to ED 8/28/21 for increased pain and constipation. Had a CT with IV contrast this time and this showed the kidney cysts and also some liver cysts and some mild atelectasis and HH but no signif or severe findings. She was offered pain med which she declined as she knew would make her more constipated. Nothing given in ED to help with constipation. Was advised to take Colace at home. \par \par Took Colace and prune juice yesterday and prune juice again today. Took generic Miralax 3 times last week without results. This AM had loose BM. Has gurgling in her stomach and feels perhaps that more BMs will be coming. +flatus. No abd pains but has some bloating and gassiness feelings. Never has issues with constipation (except once in the past when had surgery and needed opioid med post op). \par \par She notes that she has had chronic cough for past few years. She has never smoked (had second hand smoke exposure in childhood). Saw Dr. Delong and eval was wnl. Had CXR/lung imaging and PFTs and all was wnl. Does not feel like heartburn (but she takes omeprazole daily for heartburn and that controls her heartburn well). Mainly has cough with talking on phone or eating/drinking anything. No post nasal drip. She feels cough is dry cough but daughter feels that it sounds wet/like there is mucous in back of throat at times. Has had cough in [place] but worse since moved to . ?allergies as cause. Trial of nasal steroid spray and oral antihistamine not helpful in the past. Has not tried Montelukast  \par \par She has had R flank pain persistent for 3+ wks. Her PT feels it might be muscle/rib strain due to her freq cough. No rash over area of pain. No midline/spinal column pain. No loss of bowel function or change in bladder function with this pain. No V/D. No blood in stools. She is able to get comfortable at night once she positions herself on her right side or back and she sleeps well \par \par She has a h/o osteopenia and it has been many years since last DEXA test was done. No trauma. No loss of bowel function. She has urinary incontinence (being evaluated by urol for this) but this has been present for several years and does not seem related to acute back pain. No leg weakness or numbness. No fevers.

## 2021-08-30 NOTE — ASSESSMENT
[FreeTextEntry1] : TONI CORNELIUS is a 81 year old female here for eval of new R sided thoracic back pain. She has h/o OA (could be cause of back pain) and osteopenia (possibly osteoporosis) which could be causing sxs if she has a compression Fx but I think that is less likely given location of her pain. SHe has a tiny kidney stone on R side and 2 right kidney cysts but these are not cause of sxs. \par \par DDx incl muscular pain (which is diagnosis I favor based on her exam/location of pain and chronic cough), OA related pain, osteoporosis/compression Fx related pain, ?malignancy (with chronic cough) etc. \par \par She has had recent pulm eval, lung imaging, PFTs and ED eval with labs and CT x 2 in past few weeks and no worrisome cause for her cough or pain has been found. \par \par She also has constipation that seems to be related to recent use of Flomax (now d/c'ed) that has been partially responsive to use of prune juice and colace\par

## 2021-09-07 ENCOUNTER — NON-APPOINTMENT (OUTPATIENT)
Age: 81
End: 2021-09-07

## 2021-09-07 RX ORDER — MONTELUKAST 10 MG/1
10 TABLET, FILM COATED ORAL
Qty: 90 | Refills: 3 | Status: DISCONTINUED | COMMUNITY
Start: 2021-08-30 | End: 2021-09-07

## 2021-10-26 ENCOUNTER — RX RENEWAL (OUTPATIENT)
Age: 81
End: 2021-10-26

## 2021-10-26 ENCOUNTER — APPOINTMENT (OUTPATIENT)
Dept: GASTROENTEROLOGY | Facility: CLINIC | Age: 81
End: 2021-10-26
Payer: MEDICARE

## 2021-10-26 VITALS
HEART RATE: 110 BPM | HEIGHT: 63 IN | SYSTOLIC BLOOD PRESSURE: 153 MMHG | WEIGHT: 176 LBS | BODY MASS INDEX: 31.18 KG/M2 | DIASTOLIC BLOOD PRESSURE: 108 MMHG

## 2021-10-26 DIAGNOSIS — Z01.818 ENCOUNTER FOR OTHER PREPROCEDURAL EXAMINATION: ICD-10-CM

## 2021-10-26 PROCEDURE — 99214 OFFICE O/P EST MOD 30 MIN: CPT

## 2021-10-31 PROBLEM — Z01.818 ENCOUNTER FOR DIAGNOSTIC ENDOSCOPY: Status: RESOLVED | Noted: 2021-10-26 | Resolved: 2021-11-09

## 2021-10-31 NOTE — PHYSICAL EXAM
[General Appearance - Alert] : alert [General Appearance - In No Acute Distress] : in no acute distress [Auscultation Breath Sounds / Voice Sounds] : lungs were clear to auscultation bilaterally [Heart Rate And Rhythm] : heart rate was normal and rhythm regular [Heart Sounds] : normal S1 and S2 [Heart Sounds Gallop] : no gallops [Murmurs] : no murmurs [Heart Sounds Pericardial Friction Rub] : no pericardial rub [Bowel Sounds] : normal bowel sounds [Abdomen Soft] : soft [Abdomen Tenderness] : non-tender [] : no hepato-splenomegaly [Abdomen Mass (___ Cm)] : no abdominal mass palpated [Abnormal Walk] : normal gait [Nail Clubbing] : no clubbing  or cyanosis of the fingernails [Motor Tone] : muscle strength and tone were normal [Musculoskeletal - Swelling] : no joint swelling seen [Oriented To Time, Place, And Person] : oriented to person, place, and time [Impaired Insight] : insight and judgment were intact [Affect] : the affect was normal

## 2021-10-31 NOTE — ASSESSMENT
[FreeTextEntry1] : 82yo female with chronic cough\par \par It is unclear if due to reflux but other causes ruled out so far\par \par will try omeprazole 40 bid famotidine at night empirically

## 2021-10-31 NOTE — HISTORY OF PRESENT ILLNESS
[de-identified] : 82yo female with chronic cough\par \par She has had cough for years \par She has had negative evaluation in the past with egd and ENT\par She was on omeprazole 20mg daily for few weeks with no help\par negative w/u from pulmonary

## 2021-11-11 ENCOUNTER — FORM ENCOUNTER (OUTPATIENT)
Age: 81
End: 2021-11-11

## 2021-11-15 ENCOUNTER — APPOINTMENT (OUTPATIENT)
Dept: FAMILY MEDICINE | Facility: CLINIC | Age: 81
End: 2021-11-15
Payer: MEDICARE

## 2021-11-15 VITALS
BODY MASS INDEX: 31.01 KG/M2 | HEIGHT: 63 IN | OXYGEN SATURATION: 95 % | WEIGHT: 175 LBS | SYSTOLIC BLOOD PRESSURE: 132 MMHG | TEMPERATURE: 96.2 F | DIASTOLIC BLOOD PRESSURE: 80 MMHG | HEART RATE: 87 BPM

## 2021-11-15 DIAGNOSIS — Z82.49 FAMILY HISTORY OF ISCHEMIC HEART DISEASE AND OTHER DISEASES OF THE CIRCULATORY SYSTEM: ICD-10-CM

## 2021-11-15 DIAGNOSIS — M25.511 PAIN IN RIGHT SHOULDER: ICD-10-CM

## 2021-11-15 PROCEDURE — 36415 COLL VENOUS BLD VENIPUNCTURE: CPT

## 2021-11-15 PROCEDURE — G0439: CPT

## 2021-11-15 NOTE — ASSESSMENT
[FreeTextEntry1] : TONI CORNELIUS is a 81 year old female here for a physical exam.  She is also here to follow up on medical issues as noted above.\par

## 2021-11-15 NOTE — REVIEW OF SYSTEMS
[Cough] : cough [Incontinence] : incontinence [Joint Pain] : joint pain [Joint Stiffness] : joint stiffness [Back Pain] : back pain [Discharge] : no discharge [Redness] : no redness [Vision Problems] : no vision problems [Earache] : no earache [Nasal Discharge] : no nasal discharge [Sore Throat] : no sore throat [Postnasal Drip] : postnasal drip [Chest Pain] : no chest pain [Palpitations] : no palpitations [Lower Ext Edema] : no lower extremity edema [Shortness Of Breath] : no shortness of breath [Dyspnea on Exertion] : no dyspnea on exertion [Dysuria] : no dysuria [Hematuria] : no hematuria [Muscle Pain] : muscle pain [Anxiety] : no anxiety [Depression] : no depression [Negative] : Heme/Lymph [FreeTextEntry3] : +macular degeneration. sees retinologist and gets Eyelea injections periodically [FreeTextEntry4] : +chronic post nasal drip which is likely the cause of her chronic cough [FreeTextEntry6] : see HPI [FreeTextEntry8] : and likely pelvic organ prolpase, seeing urol for eval currently  [FreeTextEntry9] : OA related joint pain and stiffness. 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

## 2021-11-15 NOTE — PLAN
[FreeTextEntry1] : Reviewed age-appropriate preventive screening tests with patient. UTD on colonoscopy and no further screening needed. Due for DEXA but she declines rpt test at this point in her life as she states she would not take Rx med for bones no matter what T scores were; she will let me know if she changes her mind. Due for gyn exam as has not had exam since her 50s but she defers on gyn exams at this point in her life (but she is seeing urol for eval and treatment of possible pelvic organ prolapse). Revd pros/cons of mammogram screening at this point in her life. She would like to continue periodic mammo screening (Q2 yrs is what she prefers) and will sched for near future. \par \par She defers on ECG today as had in the hospital a few months ago. \par \par Shingrix revd/recommended. She states she has had both pneumococcal vaccines and I asked her to try to get approximate dates if possible. Also recommended COVID booster and she is considering; she would like to check Ab level as that info would help her in her decision making about booster. \par \par Discussed clean eating (eg Mediterranean style eating plan) and regular exercise/staying as physically active as possible.\par \par R shoulder pain sounds like it is likely biceps tendinosis +/- rotator cuff strain related. She avoids NSAIDs and will cont to do so. Tylenol is ok to use. Ice prn. Stay "in the box" for ROM exercises. Rx for PT given. She defers on imaging and ortho eval at this time but agrees to go for these evaluations if incr/new sxs or if sxs not improving with PT in the near future\par \par Diagnosis, common environmental causes, treatment options for environmental/seasonal/perennial allergies reviewed. Reviewed environmental control measures (keep windows closed, put on AC, shower/rinse off after being outdoors, pillow/mattress covers, HEPA type filters etc), OTC oral antihistamine +/- oral decongestant if tolerated, nasal saline flushes/Neti Pot, +/- can add Mucinex, +/- can add Singulair (Montelukast) to find what combination of meds works best. Cont ipratropium nasal spray as seems to be the med that helps her the most. Disc ok to d/c PPI med and levocetirizine if not signif helping her sxs as likely SE of these over time outweigh benefits at this stage of life and I agree with her decision to use lower doses of these while using them, \par \par Reviewed importance of good self care (eg meditation, yoga, adequate rest, regular exercise, magnesium, clean eating etc).\par \par Next PE in 1 yr.

## 2021-11-15 NOTE — HEALTH RISK ASSESSMENT
[0] : 2) Feeling down, depressed, or hopeless: Not at all (0) [PHQ-2 Negative - No further assessment needed] : PHQ-2 Negative - No further assessment needed [[id number]] : 0

## 2021-11-15 NOTE — PHYSICAL EXAM
[No Acute Distress] : no acute distress [Well Developed] : well developed [Well-Appearing] : well-appearing [Normal Sclera/Conjunctiva] : normal sclera/conjunctiva [EOMI] : extraocular movements intact [Normal Outer Ear/Nose] : the outer ears and nose were normal in appearance [No JVD] : no jugular venous distention [No Lymphadenopathy] : no lymphadenopathy [Supple] : supple [Thyroid Normal, No Nodules] : the thyroid was normal and there were no nodules present [No Respiratory Distress] : no respiratory distress  [Clear to Auscultation] : lungs were clear to auscultation bilaterally [No Accessory Muscle Use] : no accessory muscle use [Normal Rate] : normal rate  [Regular Rhythm] : with a regular rhythm [Normal S1, S2] : normal S1 and S2 [No Murmur] : no murmur heard [No Carotid Bruits] : no carotid bruits [No Varicosities] : no varicosities [Pedal Pulses Present] : the pedal pulses are present [No Edema] : there was no peripheral edema [No Extremity Clubbing/Cyanosis] : no extremity clubbing/cyanosis [Soft] : abdomen soft [Non Tender] : non-tender [Non-distended] : non-distended [No Masses] : no abdominal mass palpated [No HSM] : no HSM [Normal Posterior Cervical Nodes] : no posterior cervical lymphadenopathy [Normal Anterior Cervical Nodes] : no anterior cervical lymphadenopathy [No CVA Tenderness] : no CVA  tenderness [No Spinal Tenderness] : no spinal tenderness [No Joint Swelling] : no joint swelling [Grossly Normal Strength/Tone] : grossly normal strength/tone [No Rash] : no rash [No Skin Lesions] : no skin lesions [Coordination Grossly Intact] : coordination grossly intact [No Focal Deficits] : no focal deficits [Normal Gait] : normal gait [Normal Affect] : the affect was normal [Normal Insight/Judgement] : insight and judgment were intact [Normal Bowel Sounds] : normal bowel sounds [de-identified] : mildly obese, occas cough noted throughout visit, no resp distress [de-identified] : lungs CTA B with regular breathing and with forceful cough [de-identified] : +pain and decreased ROM R shoulder with IR and +TTP over proximal biceps insertion point R shoulder.

## 2021-11-15 NOTE — HISTORY OF PRESENT ILLNESS
[de-identified] : Her last PE was 11/2020\par \par Her last tetanus shot was 11/2020 Tdap\par Pneumovax, Prevnar-- she states she has had both of these vaccines\par Shingrix-- never\par Had flu vaccine this season\par Had Pfizer COVID primary series and is debating booster dose\par \par Her last dentist visit was within past 6 months\par Her last eye doctor appointment was within past year-- gets Eyelea injections for macular degeneration\par \par Her diet is clean/healthful overall\par Exercise-- keeps active around the house but not much formal exercise. \par \par [place],  no further screening required, Dr. Arenas is now her GI specialist \par Her last mammogram was 2019 wnl\par Her last DEXA was >10 yrs ago, declines repeat DEXA at this time as she states she would not take Rx meds no matter what\par Her last gyn exam was in her 50s, she defers on further gyn exams at this point in her life\par \par She is still struggling with chronic cough. Has seen GI and ENT and nutritionist. See prior note for details. Most recently saw Dr. Arenas who Rx'ed omeprazole 40 mg BID but this did not make her feel well (stomach pains, felt not herself). She cut back to 20 mg BID and this is better tolerated but has not yet had any impact on her cough (but she agrees to give it a few weeks longer). She is also taking levcetirizine (5 mg made her too sleepy and gave her dry mouth but she is able to tolerate 1/2 pill daily) and ipratropium nasal spray (this seems to help with the drip) Rx'ed by ENT (Dr. Amaya). She states that cough is annoying but now that she has ruled out serious cause for cough she can tolerate it and live with it knowing it is not dangerous. \par \par Dr. Clark (urol) is doing eval for possible pelvic organ prolapse as she has had increasing pressure in pelvis and feeling of possible prolapse over past few years. \par \par She has h/o R shoulder bursitis and in the past PT helped with that. She has been noticing anterior R shoulder pain and limitation in ROM for past several months. Would like to try PT again for this issue (feels different than her bursitis). Defers on XR and ortho for now knows these are options if not improving with PT. R hand dominant. Thinks sxs started when she had her knee replacement surgery as she was spending a lot of time on electronics during recovery period.

## 2021-11-16 ENCOUNTER — NON-APPOINTMENT (OUTPATIENT)
Age: 81
End: 2021-11-16

## 2021-11-16 LAB
ALBUMIN SERPL ELPH-MCNC: 4.5 G/DL
ALP BLD-CCNC: 88 U/L
ALT SERPL-CCNC: 16 U/L
ANION GAP SERPL CALC-SCNC: 13 MMOL/L
AST SERPL-CCNC: 17 U/L
BILIRUB SERPL-MCNC: 0.6 MG/DL
BUN SERPL-MCNC: 12 MG/DL
CALCIUM SERPL-MCNC: 9.6 MG/DL
CHLORIDE SERPL-SCNC: 104 MMOL/L
CHOLEST SERPL-MCNC: 228 MG/DL
CO2 SERPL-SCNC: 23 MMOL/L
COVID-19 SPIKE DOMAIN ANTIBODY INTERPRETATION: POSITIVE
CREAT SERPL-MCNC: 0.69 MG/DL
GLUCOSE SERPL-MCNC: 91 MG/DL
HDLC SERPL-MCNC: 50 MG/DL
LDLC SERPL CALC-MCNC: 139 MG/DL
MAGNESIUM SERPL-MCNC: 2 MG/DL
NONHDLC SERPL-MCNC: 179 MG/DL
POTASSIUM SERPL-SCNC: 4.1 MMOL/L
PROT SERPL-MCNC: 7.1 G/DL
SARS-COV-2 AB SERPL IA-ACNC: >250 U/ML
SODIUM SERPL-SCNC: 140 MMOL/L
TRIGL SERPL-MCNC: 200 MG/DL
TSH SERPL-ACNC: 1.11 UIU/ML

## 2021-12-03 ENCOUNTER — APPOINTMENT (OUTPATIENT)
Dept: UROLOGY | Facility: CLINIC | Age: 81
End: 2021-12-03
Payer: MEDICARE

## 2021-12-03 ENCOUNTER — OUTPATIENT (OUTPATIENT)
Dept: OUTPATIENT SERVICES | Facility: HOSPITAL | Age: 81
LOS: 1 days | End: 2021-12-03
Payer: MEDICARE

## 2021-12-03 VITALS
HEART RATE: 102 BPM | DIASTOLIC BLOOD PRESSURE: 92 MMHG | SYSTOLIC BLOOD PRESSURE: 155 MMHG | OXYGEN SATURATION: 99 % | TEMPERATURE: 97.8 F

## 2021-12-03 DIAGNOSIS — R35.0 FREQUENCY OF MICTURITION: ICD-10-CM

## 2021-12-03 DIAGNOSIS — N28.1 CYST OF KIDNEY, ACQUIRED: ICD-10-CM

## 2021-12-03 PROCEDURE — 51784 ANAL/URINARY MUSCLE STUDY: CPT | Mod: 26

## 2021-12-03 PROCEDURE — 51741 ELECTRO-UROFLOWMETRY FIRST: CPT

## 2021-12-03 PROCEDURE — 51728 CYSTOMETROGRAM W/VP: CPT | Mod: 26

## 2021-12-03 PROCEDURE — 51741 ELECTRO-UROFLOWMETRY FIRST: CPT | Mod: 26

## 2021-12-03 PROCEDURE — 51797 INTRAABDOMINAL PRESSURE TEST: CPT

## 2021-12-03 PROCEDURE — 51728 CYSTOMETROGRAM W/VP: CPT

## 2021-12-03 PROCEDURE — 51797 INTRAABDOMINAL PRESSURE TEST: CPT | Mod: 26

## 2021-12-03 PROCEDURE — 51784 ANAL/URINARY MUSCLE STUDY: CPT

## 2021-12-08 DIAGNOSIS — R32 UNSPECIFIED URINARY INCONTINENCE: ICD-10-CM

## 2021-12-08 DIAGNOSIS — N28.1 CYST OF KIDNEY, ACQUIRED: ICD-10-CM

## 2021-12-19 ENCOUNTER — FORM ENCOUNTER (OUTPATIENT)
Age: 81
End: 2021-12-19

## 2021-12-20 DIAGNOSIS — Z92.89 PERSONAL HISTORY OF OTHER MEDICAL TREATMENT: ICD-10-CM

## 2021-12-21 ENCOUNTER — NON-APPOINTMENT (OUTPATIENT)
Age: 81
End: 2021-12-21

## 2021-12-26 ENCOUNTER — FORM ENCOUNTER (OUTPATIENT)
Age: 81
End: 2021-12-26

## 2022-01-12 ENCOUNTER — APPOINTMENT (OUTPATIENT)
Dept: FAMILY MEDICINE | Facility: CLINIC | Age: 82
End: 2022-01-12
Payer: MEDICARE

## 2022-01-12 VITALS
HEIGHT: 63 IN | DIASTOLIC BLOOD PRESSURE: 80 MMHG | WEIGHT: 175 LBS | BODY MASS INDEX: 31.01 KG/M2 | OXYGEN SATURATION: 96 % | HEART RATE: 95 BPM | SYSTOLIC BLOOD PRESSURE: 122 MMHG | TEMPERATURE: 97.8 F

## 2022-01-12 DIAGNOSIS — Z11.59 ENCOUNTER FOR SCREENING FOR OTHER VIRAL DISEASES: ICD-10-CM

## 2022-01-12 PROCEDURE — 99213 OFFICE O/P EST LOW 20 MIN: CPT

## 2022-01-12 RX ORDER — LEVOCETIRIZINE DIHYDROCHLORIDE 5 MG/1
5 TABLET ORAL DAILY
Refills: 0 | Status: DISCONTINUED | COMMUNITY
End: 2022-01-12

## 2022-01-12 NOTE — ASSESSMENT
[FreeTextEntry1] : Patient is a 82yo female who presents to the office today complaining of 1 day wet cough, productive of clear sputum, with mild burning sensation in the chest only when coughing.  No chest pain or SOB.  No fevers.  +COVID contact at home ().  Daughter present, pt and daughter requesting CXR.\par \par Cough\par - COVID PCR sent to lab.\par - CXR, RX provided.\par - Albuterol every 4-6 hours as needed.\par - Bromfed for cough.\par - Supportive care including increased fluids, Ibuprofen/Acetaminophen as needed for pain/aches/fevers, OTC cough suppressants/nasal decongestants as needed.\par \par Call the office or go to the ED immediately if you develop new, worsening or concerning symptoms including high fever, severe headache/worst headache of your life, confusion, dizziness/lightheadedness, loss of consciousness, severe chest pain, difficulty breathing, shortness of breath, severe abdominal pain, excessive vomiting/diarrhea, inability to feel/move the extremities, or any other concerning symptoms.\par

## 2022-01-12 NOTE — PHYSICAL EXAM
[No Edema] : there was no peripheral edema [Normal Posterior Cervical Nodes] : no posterior cervical lymphadenopathy [Normal Anterior Cervical Nodes] : no anterior cervical lymphadenopathy [Normal] : no rash [Coordination Grossly Intact] : coordination grossly intact [No Focal Deficits] : no focal deficits [Normal Gait] : normal gait [Normal Affect] : the affect was normal [Normal Insight/Judgement] : insight and judgment were intact [de-identified] : PND

## 2022-01-12 NOTE — HISTORY OF PRESENT ILLNESS
[FreeTextEntry8] : Patient is a 82yo female who presents to the office today complaining of cough.  Patient has a dry cough at baseline, has been seen at this office in the past for the cough, believed to be secondary to possible PND/GERD.  Patient states the cough that she has now is different from her baseline cough.  States for the past 1 day her cough has been wet and productive of clear sputum.  Patient states she has mild burning in the chest only when she coughs, not with exertion or at rest.  Patient denies chest pains or shortness of breath.  Denies leg pain/swelling, recent travel, recent surgeries, or history of blood clots.\par \par Of note, patient's  is sick at home with confirmed COVID+ diagnosis by home test.   has been sick for the past 4 days.\par \par Patient denies history of lung problems, aside from her baseline chronic dry cough.\par +hx PNA long time ago >10 years no hospitalization required\par \par history of congenital heart murmur, resolved.\par Does not follow with Cardiologist\par \par Received COVID vaccine x2 Pfizer 2/5/2021 first.

## 2022-01-12 NOTE — PLAN
[FreeTextEntry1] : See assessment.\par Supportive care.\par COVID PCR sent to lab.\par CXR for change in chronic cough.

## 2022-01-12 NOTE — HEALTH RISK ASSESSMENT

## 2022-01-12 NOTE — REVIEW OF SYSTEMS
[Postnasal Drip] : postnasal drip [Cough] : cough [Negative] : Integumentary [Shortness Of Breath] : no shortness of breath [Wheezing] : no wheezing [Dyspnea on Exertion] : no dyspnea on exertion

## 2022-01-13 ENCOUNTER — NON-APPOINTMENT (OUTPATIENT)
Age: 82
End: 2022-01-13

## 2022-01-13 ENCOUNTER — APPOINTMENT (OUTPATIENT)
Dept: FAMILY MEDICINE | Facility: CLINIC | Age: 82
End: 2022-01-13

## 2022-01-13 NOTE — ED STATDOCS - RESPIRATORY, MLM
Impression: Vitreous degeneration, bilateral: H43.813. OU. Plan: Chronic. Observe. breath sounds clear and equal bilaterally.

## 2022-01-14 LAB — SARS-COV-2 N GENE NPH QL NAA+PROBE: DETECTED

## 2022-01-31 ENCOUNTER — APPOINTMENT (OUTPATIENT)
Dept: UROLOGY | Facility: CLINIC | Age: 82
End: 2022-01-31

## 2022-02-22 ENCOUNTER — NON-APPOINTMENT (OUTPATIENT)
Age: 82
End: 2022-02-22

## 2022-02-22 ENCOUNTER — RX RENEWAL (OUTPATIENT)
Age: 82
End: 2022-02-22

## 2022-02-22 PROBLEM — J30.89 PERENNIAL ALLERGIC RHINITIS: Status: ACTIVE | Noted: 2021-08-28

## 2022-02-23 ENCOUNTER — APPOINTMENT (OUTPATIENT)
Dept: FAMILY MEDICINE | Facility: CLINIC | Age: 82
End: 2022-02-23

## 2022-02-23 DIAGNOSIS — J30.89 OTHER ALLERGIC RHINITIS: ICD-10-CM

## 2022-03-07 ENCOUNTER — NON-APPOINTMENT (OUTPATIENT)
Age: 82
End: 2022-03-07

## 2022-03-14 ENCOUNTER — RX RENEWAL (OUTPATIENT)
Age: 82
End: 2022-03-14

## 2022-03-14 ENCOUNTER — FORM ENCOUNTER (OUTPATIENT)
Age: 82
End: 2022-03-14

## 2022-03-16 ENCOUNTER — APPOINTMENT (OUTPATIENT)
Dept: UROLOGY | Facility: CLINIC | Age: 82
End: 2022-03-16

## 2022-04-07 ENCOUNTER — APPOINTMENT (OUTPATIENT)
Dept: UROLOGY | Facility: CLINIC | Age: 82
End: 2022-04-07

## 2022-04-11 PROBLEM — Z11.59 SCREENING FOR VIRAL DISEASE: Status: ACTIVE | Noted: 2021-11-15

## 2022-04-25 ENCOUNTER — FORM ENCOUNTER (OUTPATIENT)
Age: 82
End: 2022-04-25

## 2022-05-16 ENCOUNTER — APPOINTMENT (OUTPATIENT)
Dept: FAMILY MEDICINE | Facility: CLINIC | Age: 82
End: 2022-05-16

## 2022-05-23 ENCOUNTER — APPOINTMENT (OUTPATIENT)
Dept: UROLOGY | Facility: CLINIC | Age: 82
End: 2022-05-23

## 2022-05-31 ENCOUNTER — FORM ENCOUNTER (OUTPATIENT)
Age: 82
End: 2022-05-31

## 2022-06-05 ENCOUNTER — EMERGENCY (EMERGENCY)
Facility: HOSPITAL | Age: 82
LOS: 0 days | Discharge: HOME | End: 2022-06-06
Attending: EMERGENCY MEDICINE
Payer: MEDICARE

## 2022-06-05 VITALS
HEART RATE: 93 BPM | TEMPERATURE: 98 F | SYSTOLIC BLOOD PRESSURE: 158 MMHG | DIASTOLIC BLOOD PRESSURE: 84 MMHG | OXYGEN SATURATION: 97 % | HEIGHT: 63 IN | RESPIRATION RATE: 18 BRPM

## 2022-06-05 DIAGNOSIS — K21.9 GASTRO-ESOPHAGEAL REFLUX DISEASE WITHOUT ESOPHAGITIS: ICD-10-CM
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
## 2022-06-05 DIAGNOSIS — N20.0 CALCULUS OF KIDNEY: ICD-10-CM

## 2022-06-05 DIAGNOSIS — S89.91XA UNSPECIFIED INJURY OF RIGHT LOWER LEG, INITIAL ENCOUNTER: ICD-10-CM

## 2022-06-05 DIAGNOSIS — Z20.822 CONTACT WITH AND (SUSPECTED) EXPOSURE TO COVID-19: ICD-10-CM

## 2022-06-05 DIAGNOSIS — S22.32XA FRACTURE OF ONE RIB, LEFT SIDE, INITIAL ENCOUNTER FOR CLOSED FRACTURE: ICD-10-CM
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
## 2022-06-05 DIAGNOSIS — Z88.5 ALLERGY STATUS TO NARCOTIC AGENT: ICD-10-CM

## 2022-06-05 DIAGNOSIS — Z88.1 ALLERGY STATUS TO OTHER ANTIBIOTIC AGENTS STATUS: ICD-10-CM

## 2022-06-05 DIAGNOSIS — M79.10 MYALGIA, UNSPECIFIED SITE: ICD-10-CM

## 2022-06-05 DIAGNOSIS — I71.2 THORACIC AORTIC ANEURYSM, WITHOUT RUPTURE: ICD-10-CM
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
## 2022-06-05 PROCEDURE — 99284 EMERGENCY DEPT VISIT MOD MDM: CPT | Mod: FS

## 2022-06-05 PROCEDURE — U0003: CPT

## 2022-06-05 PROCEDURE — 70450 CT HEAD/BRAIN W/O DYE: CPT | Mod: 26,MA

## 2022-06-05 PROCEDURE — 73590 X-RAY EXAM OF LOWER LEG: CPT | Mod: 26,RT

## 2022-06-05 PROCEDURE — 71250 CT THORAX DX C-: CPT | Mod: 26,MA

## 2022-06-05 PROCEDURE — 73590 X-RAY EXAM OF LOWER LEG: CPT | Mod: RT

## 2022-06-05 PROCEDURE — 70450 CT HEAD/BRAIN W/O DYE: CPT | Mod: MA

## 2022-06-05 PROCEDURE — 99284 EMERGENCY DEPT VISIT MOD MDM: CPT | Mod: 25

## 2022-06-05 PROCEDURE — U0005: CPT

## 2022-06-05 PROCEDURE — 71250 CT THORAX DX C-: CPT | Mod: MA

## 2022-06-05 RX ORDER — OXYCODONE AND ACETAMINOPHEN 5; 325 MG/1; MG/1
1 TABLET ORAL ONCE
Refills: 0 | Status: DISCONTINUED | OUTPATIENT
Start: 2022-06-05 | End: 2022-06-05

## 2022-06-05 RX ADMIN — OXYCODONE AND ACETAMINOPHEN 1 TABLET(S): 5; 325 TABLET ORAL at 22:58

## 2022-06-05 NOTE — ED PROVIDER NOTE - PATIENT PORTAL LINK FT
You can access the FollowMyHealth Patient Portal offered by [facility] by registering at the following website: [email or web address]. By joining [facility] [place]’s FollowMyHealth portal, you will also be able to view your health information using other applications (apps) compatible with our system.

## 2022-06-05 NOTE — ED PROVIDER NOTE - MUSCULOSKELETAL, MLM
Spine appears normal, range of motion is not limited, no muscle or joint tenderness. RLE: +Moderate size hematoma right calf consistent with leg contusion. +Tenderness. No bony deformity. NVI. 2+ distal pulses. Straight raise RLE WNL. No hip tenderness. Right knee exam is normal.

## 2022-06-05 NOTE — ED PROVIDER NOTE - CARE PLAN
1 Principal Discharge DX:	Musculoskeletal pain  Secondary Diagnosis:	Thoracic ascending aortic aneurysm

## 2022-06-05 NOTE — ED PROVIDER NOTE - RESPIRATORY, MLM
Breath sounds clear and equal bilaterally. NO w/w/r. CHEST: Equal chest expansion and rise. +Moderate MSK tenderness left lateral ribcage area.

## 2022-06-05 NOTE — ED PROVIDER NOTE - CARE PROVIDER_API CALL
ManvarSingh, Pallavi B (MD)  Vascular Surgery  [address] [place] [address], [address] [place] [address]  Phone: [phone]  Fax: [phone]  Follow Up Time:

## 2022-06-05 NOTE — ED ADULT NURSE NOTE - OBJECTIVE STATEMENT
As per pt,  fell and she tired to catch his fall and fell too. Pt confirms head strike. No LOC. No pain to head. Pain the right lower leg, MD aware

## 2022-06-05 NOTE — ED ADULT TRIAGE NOTE - CHIEF COMPLAINT QUOTE
pt bib ems s/p fall.  tripped and fell onto pt. c/o right hip pain, right wrist pain, right lower leg pain. denies LOC, CP,SOB,N,V,D,F.denies anticoagulants.

## 2022-06-05 NOTE — ED PROVIDER NOTE - PROGRESS NOTE DETAILS
PA: Patient presents with fall injury to right leg and left ribcage, not sure if she hit her head. Will get xrays, CT. Reassess. ~Nando Mason PA-C

## 2022-06-05 NOTE — ED PROVIDER NOTE - OBJECTIVE STATEMENT
PA: Patient is an 82 year old male with PMHx of kidney stone, GERD who presents to [facility] c/o right leg injury, left rib injury from trip and fall in her son's backyard today. Patient may have hit her head but she cannot be certain. No LOC. No neck injury. DENIES hip or knee injury. ~Nando Mason PA-C

## 2022-06-05 NOTE — ED PROVIDER NOTE - NS ED ATTENDING STATEMENT MOD
This was a shared visit with the [facility]. I reviewed and verified the documentation and independently performed the documented:

## 2022-06-05 NOTE — ED PROVIDER NOTE - ENMT NEGATIVE STATEMENT, MLM
?HEAD STRIKE. Ears: no ear pain and no hearing problems. Nose: no nasal congestion and no nasal drainage. Mouth/Throat: no dysphagia, no hoarseness and no throat pain. Neck: no lumps, no pain, no stiffness and no swollen glands.

## 2022-06-05 NOTE — ED PROVIDER NOTE - CLINICAL SUMMARY MEDICAL DECISION MAKING FREE TEXT BOX
CTs and XR negative for acute trauma.  Patient able to ambulate.  Okay for d/c home.  Incidental of mild aneurysmal aorta, f/u with vascular.

## 2022-06-05 NOTE — ED PROVIDER NOTE - NSFOLLOWUPINSTRUCTIONS_ED_ALL_ED_FT
Can take tylenol or motrin for pain as needed.    Rest as much as possible.  Elevate your affected leg to prevent increased swelling.  You can ice the leg as well to help with swelling.    Please make appointment with vascular surgery for follow up on incidental finding of aortic aneurysm.  Yours is mild, however these can enlarge over time and become dangerous.

## 2022-06-05 NOTE — ED ADULT NURSE NOTE - NSIMPLEMENTINTERV_GEN_ALL_ED
Implemented All Universal Safety Interventions:  Delight to call system. Call bell, personal items and telephone within reach. Instruct patient to call for assistance. Room bathroom lighting operational. Non-slip footwear when patient is off stretcher. Physically safe environment: no spills, clutter or unnecessary equipment. Stretcher in lowest position, wheels locked, appropriate side rails in place.

## 2022-06-06 VITALS
HEART RATE: 83 BPM | OXYGEN SATURATION: 96 % | TEMPERATURE: 98 F | SYSTOLIC BLOOD PRESSURE: 151 MMHG | RESPIRATION RATE: 18 BRPM | DIASTOLIC BLOOD PRESSURE: 86 MMHG

## 2022-06-09 ENCOUNTER — FORM ENCOUNTER (OUTPATIENT)
Age: 82
End: 2022-06-09

## 2022-06-10 PROBLEM — K21.9 GASTRO-ESOPHAGEAL REFLUX DISEASE WITHOUT ESOPHAGITIS: Chronic | Status: ACTIVE | Noted: 2022-06-05

## 2022-06-14 ENCOUNTER — NON-APPOINTMENT (OUTPATIENT)
Age: 82
End: 2022-06-14

## 2022-06-15 ENCOUNTER — APPOINTMENT (OUTPATIENT)
Dept: FAMILY MEDICINE | Facility: CLINIC | Age: 82
End: 2022-06-15
Payer: MEDICARE

## 2022-06-15 ENCOUNTER — APPOINTMENT (OUTPATIENT)
Dept: ULTRASOUND IMAGING | Facility: CLINIC | Age: 82
End: 2022-06-15
Payer: MEDICARE

## 2022-06-15 ENCOUNTER — OUTPATIENT (OUTPATIENT)
Dept: OUTPATIENT SERVICES | Facility: HOSPITAL | Age: 82
LOS: 1 days | End: 2022-06-15
Payer: MEDICARE

## 2022-06-15 VITALS
SYSTOLIC BLOOD PRESSURE: 148 MMHG | TEMPERATURE: 97.5 F | BODY MASS INDEX: 31.01 KG/M2 | HEART RATE: 75 BPM | WEIGHT: 175 LBS | OXYGEN SATURATION: 95 % | HEIGHT: 63 IN | DIASTOLIC BLOOD PRESSURE: 86 MMHG

## 2022-06-15 DIAGNOSIS — M79.89 OTHER SPECIFIED SOFT TISSUE DISORDERS: ICD-10-CM

## 2022-06-15 PROCEDURE — 99213 OFFICE O/P EST LOW 20 MIN: CPT

## 2022-06-15 PROCEDURE — 93971 EXTREMITY STUDY: CPT | Mod: 26,RT

## 2022-06-15 PROCEDURE — 93971 EXTREMITY STUDY: CPT

## 2022-06-15 RX ORDER — ALBUTEROL SULFATE 90 UG/1
108 (90 BASE) INHALANT RESPIRATORY (INHALATION)
Qty: 18 | Refills: 0 | Status: DISCONTINUED | COMMUNITY
Start: 2022-02-22 | End: 2022-06-15

## 2022-06-15 RX ORDER — ALBUTEROL SULFATE 90 UG/1
108 (90 BASE) AEROSOL, METERED RESPIRATORY (INHALATION)
Qty: 1 | Refills: 1 | Status: DISCONTINUED | COMMUNITY
Start: 2022-01-12 | End: 2022-06-15

## 2022-06-15 RX ORDER — BROMPHENIRAMINE MALEATE, PSEUDOEPHEDRINE HYDROCHLORIDE, 2; 30; 10 MG/5ML; MG/5ML; MG/5ML
30-2-10 SYRUP ORAL
Qty: 118 | Refills: 0 | Status: DISCONTINUED | COMMUNITY
Start: 2022-01-12 | End: 2022-06-15

## 2022-06-15 NOTE — PHYSICAL EXAM
[No JVD] : no jugular venous distention [Normal] : normal rate, regular rhythm, normal S1 and S2 and no murmur heard [No Edema] : there was no peripheral edema [No Rash] : no rash [Coordination Grossly Intact] : coordination grossly intact [No Focal Deficits] : no focal deficits [Normal Gait] : normal gait [Normal Affect] : the affect was normal [Normal Insight/Judgement] : insight and judgment were intact [de-identified] : ecchymosis, redness, warmth, hardening of the right calf with ecchymosis extending down towards the foot.  full ROM of the ankle/knee.  DP and PT pulses 2+ and strong.  Sensation intact.

## 2022-06-15 NOTE — HISTORY OF PRESENT ILLNESS
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  No acute traumatic injuries.\par X-ray right tib/fib negative for acute findings.  Right knee replacement intact.\par \par Pt reports persistent/worsening pain/swelling/redness of the right calf since incident.\par Has been applying ice without significant improvement.\par Pt able to ambulate without assistance.\par Pt is not on blood thinners.\par pt denies CP, SOB, palpitations.\par no history of blood clots.\par \par Pt was seen by Cardiology, Dr. Shipman, in regards to aneurysmal dilatation of the thoracic aorta.\par Pt was put on beta blocker at that time, has been taking x3 days, feels very tired, not sleeping well, wants to stop it.\par Pt encouraged to reach out to Dr. Shipman about options.

## 2022-06-15 NOTE — ASSESSMENT
[FreeTextEntry1] : Patient is an 83yo female presenting to the office complaining of pain/redness/swelling/ecchymosis of the right posterior calf, persistent/worsening since direct trauma on the area on 6/5/22.\par \par Right Leg Swelling\par - Venous duplex US RLE r/o DVT.\par - Likely hematoma.\par - Rest, heat, elevated, compression (ACE wrap provided).\par - Will take time to heal.\par - Follow up as needed.\par \par Call the office or go to the ED immediately if you develop new, worsening or concerning symptoms including high fever, severe headache/worst headache of your life, confusion, dizziness/lightheadedness, loss of consciousness, severe chest pain, difficulty breathing, shortness of breath, severe abdominal pain, excessive vomiting/diarrhea, inability to feel/move the extremities, or any other concerning symptoms.\par

## 2022-06-16 ENCOUNTER — NON-APPOINTMENT (OUTPATIENT)
Age: 82
End: 2022-06-16

## 2022-06-22 ENCOUNTER — NON-APPOINTMENT (OUTPATIENT)
Age: 82
End: 2022-06-22

## 2022-06-23 ENCOUNTER — INPATIENT (INPATIENT)
Facility: HOSPITAL | Age: 82
LOS: 3 days | Discharge: HOME | DRG: 603 | End: 2022-06-27
Attending: INTERNAL MEDICINE | Admitting: INTERNAL MEDICINE
Payer: MEDICARE

## 2022-06-23 VITALS
HEIGHT: 63 IN | HEART RATE: 115 BPM | OXYGEN SATURATION: 96 % | DIASTOLIC BLOOD PRESSURE: 101 MMHG | SYSTOLIC BLOOD PRESSURE: 148 MMHG | WEIGHT: 171.96 LBS | RESPIRATION RATE: 18 BRPM | TEMPERATURE: 98 F

## 2022-06-23 DIAGNOSIS — L03.115 CELLULITIS OF RIGHT LOWER LIMB: ICD-10-CM

## 2022-06-23 LAB
ALBUMIN SERPL ELPH-MCNC: 3.7 G/DL — SIGNIFICANT CHANGE UP (ref 3.3–5)
ALP SERPL-CCNC: 117 U/L — SIGNIFICANT CHANGE UP (ref 40–120)
ALT FLD-CCNC: 21 U/L — SIGNIFICANT CHANGE UP (ref 12–78)
ANION GAP SERPL CALC-SCNC: 4 MMOL/L — LOW (ref 5–17)
APPEARANCE UR: CLEAR — SIGNIFICANT CHANGE UP
APTT BLD: 30.5 SEC — SIGNIFICANT CHANGE UP (ref 27.5–35.5)
AST SERPL-CCNC: 9 U/L — LOW (ref 15–37)
BASOPHILS # BLD AUTO: 0.03 K/UL — SIGNIFICANT CHANGE UP (ref 0–0.2)
BASOPHILS NFR BLD AUTO: 0.4 % — SIGNIFICANT CHANGE UP (ref 0–2)
BILIRUB SERPL-MCNC: 0.5 MG/DL — SIGNIFICANT CHANGE UP (ref 0.2–1.2)
BILIRUB UR-MCNC: NEGATIVE — SIGNIFICANT CHANGE UP
BUN SERPL-MCNC: 13 MG/DL — SIGNIFICANT CHANGE UP (ref 7–23)
CALCIUM SERPL-MCNC: 9.3 MG/DL — SIGNIFICANT CHANGE UP (ref 8.5–10.1)
CHLORIDE SERPL-SCNC: 106 MMOL/L — SIGNIFICANT CHANGE UP (ref 96–108)
CO2 SERPL-SCNC: 28 MMOL/L — SIGNIFICANT CHANGE UP (ref 22–31)
COLOR SPEC: YELLOW — SIGNIFICANT CHANGE UP
CREAT SERPL-MCNC: 0.82 MG/DL — SIGNIFICANT CHANGE UP (ref 0.5–1.3)
DIFF PNL FLD: NEGATIVE — SIGNIFICANT CHANGE UP
EGFR: 72 ML/MIN/1.73M2 — SIGNIFICANT CHANGE UP
EOSINOPHIL # BLD AUTO: 0.11 K/UL — SIGNIFICANT CHANGE UP (ref 0–0.5)
EOSINOPHIL NFR BLD AUTO: 1.4 % — SIGNIFICANT CHANGE UP (ref 0–6)
GLUCOSE SERPL-MCNC: 134 MG/DL — HIGH (ref 70–99)
GLUCOSE UR QL: NEGATIVE — SIGNIFICANT CHANGE UP
HCT VFR BLD CALC: 40 % — SIGNIFICANT CHANGE UP (ref 34.5–45)
HGB BLD-MCNC: 13.3 G/DL — SIGNIFICANT CHANGE UP (ref 11.5–15.5)
IMM GRANULOCYTES NFR BLD AUTO: 0.5 % — SIGNIFICANT CHANGE UP (ref 0–1.5)
INR BLD: 1.06 RATIO — SIGNIFICANT CHANGE UP (ref 0.88–1.16)
KETONES UR-MCNC: NEGATIVE — SIGNIFICANT CHANGE UP
LACTATE SERPL-SCNC: 1.4 MMOL/L — SIGNIFICANT CHANGE UP (ref 0.7–2)
LEUKOCYTE ESTERASE UR-ACNC: ABNORMAL
LYMPHOCYTES # BLD AUTO: 1.35 K/UL — SIGNIFICANT CHANGE UP (ref 1–3.3)
LYMPHOCYTES # BLD AUTO: 17.2 % — SIGNIFICANT CHANGE UP (ref 13–44)
MCHC RBC-ENTMCNC: 31.4 PG — SIGNIFICANT CHANGE UP (ref 27–34)
MCHC RBC-ENTMCNC: 33.3 GM/DL — SIGNIFICANT CHANGE UP (ref 32–36)
MCV RBC AUTO: 94.3 FL — SIGNIFICANT CHANGE UP (ref 80–100)
MONOCYTES # BLD AUTO: 0.99 K/UL — HIGH (ref 0–0.9)
MONOCYTES NFR BLD AUTO: 12.6 % — SIGNIFICANT CHANGE UP (ref 2–14)
NEUTROPHILS # BLD AUTO: 5.31 K/UL — SIGNIFICANT CHANGE UP (ref 1.8–7.4)
NEUTROPHILS NFR BLD AUTO: 67.9 % — SIGNIFICANT CHANGE UP (ref 43–77)
NITRITE UR-MCNC: NEGATIVE — SIGNIFICANT CHANGE UP
PH UR: 6 — SIGNIFICANT CHANGE UP (ref 5–8)
PLATELET # BLD AUTO: 372 K/UL — SIGNIFICANT CHANGE UP (ref 150–400)
POTASSIUM SERPL-MCNC: 4.2 MMOL/L — SIGNIFICANT CHANGE UP (ref 3.5–5.3)
POTASSIUM SERPL-SCNC: 4.2 MMOL/L — SIGNIFICANT CHANGE UP (ref 3.5–5.3)
PROT SERPL-MCNC: 7.6 GM/DL — SIGNIFICANT CHANGE UP (ref 6–8.3)
PROT UR-MCNC: NEGATIVE — SIGNIFICANT CHANGE UP
PROTHROM AB SERPL-ACNC: 12.3 SEC — SIGNIFICANT CHANGE UP (ref 10.5–13.4)
RBC # BLD: 4.24 M/UL — SIGNIFICANT CHANGE UP (ref 3.8–5.2)
RBC # FLD: 12.6 % — SIGNIFICANT CHANGE UP (ref 10.3–14.5)
SARS-COV-2 RNA SPEC QL NAA+PROBE: SIGNIFICANT CHANGE UP
SODIUM SERPL-SCNC: 138 MMOL/L — SIGNIFICANT CHANGE UP (ref 135–145)
SP GR SPEC: 1.01 — SIGNIFICANT CHANGE UP (ref 1.01–1.02)
UROBILINOGEN FLD QL: 1
WBC # BLD: 7.83 K/UL — SIGNIFICANT CHANGE UP (ref 3.8–10.5)
WBC # FLD AUTO: 7.83 K/UL — SIGNIFICANT CHANGE UP (ref 3.8–10.5)

## 2022-06-23 PROCEDURE — 80202 ASSAY OF VANCOMYCIN: CPT

## 2022-06-23 PROCEDURE — 36415 COLL VENOUS BLD VENIPUNCTURE: CPT

## 2022-06-23 PROCEDURE — 85027 COMPLETE CBC AUTOMATED: CPT

## 2022-06-23 PROCEDURE — 99223 1ST HOSP IP/OBS HIGH 75: CPT

## 2022-06-23 PROCEDURE — 99285 EMERGENCY DEPT VISIT HI MDM: CPT | Mod: FS

## 2022-06-23 PROCEDURE — 93005 ELECTROCARDIOGRAM TRACING: CPT

## 2022-06-23 PROCEDURE — 76882 US LMTD JT/FCL EVL NVASC XTR: CPT | Mod: 26,RT

## 2022-06-23 PROCEDURE — 80048 BASIC METABOLIC PNL TOTAL CA: CPT

## 2022-06-23 RX ORDER — NALOXONE HYDROCHLORIDE 4 MG/.1ML
0.4 SPRAY NASAL ONCE
Refills: 0 | Status: DISCONTINUED | OUTPATIENT
Start: 2022-06-23 | End: 2022-06-27

## 2022-06-23 RX ORDER — VANCOMYCIN HCL 1 G
1000 VIAL (EA) INTRAVENOUS EVERY 12 HOURS
Refills: 0 | Status: DISCONTINUED | OUTPATIENT
Start: 2022-06-23 | End: 2022-06-27

## 2022-06-23 RX ORDER — PIPERACILLIN AND TAZOBACTAM 4; .5 G/20ML; G/20ML
3.38 INJECTION, POWDER, LYOPHILIZED, FOR SOLUTION INTRAVENOUS ONCE
Refills: 0 | Status: COMPLETED | OUTPATIENT
Start: 2022-06-23 | End: 2022-06-23

## 2022-06-23 RX ORDER — SENNA PLUS 8.6 MG/1
2 TABLET ORAL AT BEDTIME
Refills: 0 | Status: DISCONTINUED | OUTPATIENT
Start: 2022-06-23 | End: 2022-06-27

## 2022-06-23 RX ORDER — VANCOMYCIN HCL 1 G
1000 VIAL (EA) INTRAVENOUS ONCE
Refills: 0 | Status: DISCONTINUED | OUTPATIENT
Start: 2022-06-23 | End: 2022-06-23

## 2022-06-23 RX ORDER — OXYCODONE HYDROCHLORIDE 5 MG/1
5 TABLET ORAL EVERY 4 HOURS
Refills: 0 | Status: DISCONTINUED | OUTPATIENT
Start: 2022-06-23 | End: 2022-06-27

## 2022-06-23 RX ORDER — VANCOMYCIN HCL 1 G
1250 VIAL (EA) INTRAVENOUS ONCE
Refills: 0 | Status: COMPLETED | OUTPATIENT
Start: 2022-06-23 | End: 2022-06-23

## 2022-06-23 RX ORDER — PANTOPRAZOLE SODIUM 20 MG/1
40 TABLET, DELAYED RELEASE ORAL
Refills: 0 | Status: DISCONTINUED | OUTPATIENT
Start: 2022-06-23 | End: 2022-06-27

## 2022-06-23 RX ORDER — POLYETHYLENE GLYCOL 3350 17 G/17G
17 POWDER, FOR SOLUTION ORAL DAILY
Refills: 0 | Status: DISCONTINUED | OUTPATIENT
Start: 2022-06-23 | End: 2022-06-27

## 2022-06-23 RX ORDER — OXYCODONE HYDROCHLORIDE 5 MG/1
10 TABLET ORAL EVERY 4 HOURS
Refills: 0 | Status: DISCONTINUED | OUTPATIENT
Start: 2022-06-23 | End: 2022-06-27

## 2022-06-23 RX ORDER — ACETAMINOPHEN 500 MG
1000 TABLET ORAL ONCE
Refills: 0 | Status: COMPLETED | OUTPATIENT
Start: 2022-06-23 | End: 2022-06-23

## 2022-06-23 RX ORDER — ACETAMINOPHEN 500 MG
1000 TABLET ORAL EVERY 8 HOURS
Refills: 0 | Status: DISCONTINUED | OUTPATIENT
Start: 2022-06-23 | End: 2022-06-27

## 2022-06-23 RX ORDER — ONDANSETRON 8 MG/1
4 TABLET, FILM COATED ORAL EVERY 6 HOURS
Refills: 0 | Status: DISCONTINUED | OUTPATIENT
Start: 2022-06-23 | End: 2022-06-27

## 2022-06-23 RX ORDER — PIPERACILLIN AND TAZOBACTAM 4; .5 G/20ML; G/20ML
3.38 INJECTION, POWDER, LYOPHILIZED, FOR SOLUTION INTRAVENOUS EVERY 8 HOURS
Refills: 0 | Status: DISCONTINUED | OUTPATIENT
Start: 2022-06-23 | End: 2022-06-24

## 2022-06-23 RX ADMIN — OXYCODONE HYDROCHLORIDE 5 MILLIGRAM(S): 5 TABLET ORAL at 22:54

## 2022-06-23 RX ADMIN — PIPERACILLIN AND TAZOBACTAM 3.38 GRAM(S): 4; .5 INJECTION, POWDER, LYOPHILIZED, FOR SOLUTION INTRAVENOUS at 16:50

## 2022-06-23 RX ADMIN — OXYCODONE HYDROCHLORIDE 5 MILLIGRAM(S): 5 TABLET ORAL at 23:24

## 2022-06-23 RX ADMIN — PIPERACILLIN AND TAZOBACTAM 200 GRAM(S): 4; .5 INJECTION, POWDER, LYOPHILIZED, FOR SOLUTION INTRAVENOUS at 16:20

## 2022-06-23 RX ADMIN — Medication 1000 MILLIGRAM(S): at 17:53

## 2022-06-23 RX ADMIN — Medication 400 MILLIGRAM(S): at 17:38

## 2022-06-23 RX ADMIN — Medication 166.67 MILLIGRAM(S): at 17:36

## 2022-06-23 NOTE — ED STATDOCS - PROGRESS NOTE DETAILS
81 yo female with a PMH of GERD presents with R calf pain, swelling, and redness. Pt states she had trauma to the RLE approx 18 days ago when her  fell on her, was evaluated, and d/c home. Since then she noticed the calf was hard. Was seen by her PMD Wednesday and had a sono which was negative for a DVT. Was seen by her PT who noticed her leg was red and outlined it with a pen. +malaise and weakness which she said was unusual for her. Will check labs, sono, IV abx and likely admission. -Jose Abrams PA-C Sono showing irregular fluid collection to the R calf. Discussed case with ortho team which states its not an ortho case and more gen surgery. Called the sx resident phone and currently scrubbed in. Left message with the person holding the phone and gave the pt's name. -Jose Abrams PA-C Surgery came to see pt. States no surgical intervention and no drainage at this time and to admit to medicine for iv abx. -Jose Abrams PA-C

## 2022-06-23 NOTE — ED STATDOCS - NS ED ROS FT
Constitutional: No fever or chills  Eyes: No visual changes  HEENT: No throat pain  CV: No chest pain  Resp: No SOB no cough  GI: No abd pain, nausea or vomiting  : No dysuria  MSK: No musculoskeletal pain  Skin: No rash, +abscess on R LE  Neuro: No headache

## 2022-06-23 NOTE — ED STATDOCS - CARE PLAN
1 Principal Discharge DX:	Cellulitis of right lower extremity   Principal Discharge DX:	Cellulitis of right lower extremity  Secondary Diagnosis:	Calf swelling

## 2022-06-23 NOTE — ED ADULT NURSE NOTE - OBJECTIVE STATEMENT
Pt presents to ED for right lower leg edema and redness.  Pt aox3 from home reports tripped and fell and hit brick last week and yesterday noticed leg swelling, redness with migration of redness down right lower leg. Pt reports weakness, denies fevers at this time.  Right calf appears warm, hard, red progressing from knee to ankle.

## 2022-06-23 NOTE — ED STATDOCS - NS ED ATTENDING STATEMENT MOD
I have seen and examined this patient and fully participated in the care of this patient as the teaching attending.  The service was shared with the [facility].  I reviewed and verified the documentation and independently performed the documented:

## 2022-06-23 NOTE — ED STATDOCS - OBJECTIVE STATEMENT
83 y/o female with a PMHx of renal calculi, GERD, and partial knee replacement, presents to the ED c/o R LE pain in setting of possible cellulitis. Pt states  fell on pt and pt's r calf landed on a brick. Went to UC and was prompted to ED. Pt with erythematous and raised skin on RLE. 1 week ago had out-patient sonogram that indicated no blood clot. on Omeprazole for acid reflux. Pt with allergy to Cipro. Not up to date on TDAP. PMD: Dr. Milian.

## 2022-06-23 NOTE — PHARMACOTHERAPY INTERVENTION NOTE - COMMENTS
Medication history complete, [facility] medication with patient [facility]

## 2022-06-23 NOTE — ED STATDOCS - PHYSICAL EXAMINATION
Constitutional: NAD AOx3  Eyes: PERRL EOMI  Head: Normocephalic atraumatic  Mouth: MMM  Cardiac: regular rate and rhythm  Resp: Lungs CTAB  GI: Abd s/nd/nt  Neuro: CN2-12 grossly intact, BUTTS x 4  Skin: No visible rashes anterior R shin yellow green contusion, past calf has area of errythema marked with marker from yesterday ascending past marker with superior aspect indurated and ttp

## 2022-06-23 NOTE — CONSULT NOTE ADULT - SUBJECTIVE AND OBJECTIVE BOX
83 y/o female with a PMHx of renal calculi, GERD, and partial knee replacement, presents to the ED c/o R LE pain in setting of possible cellulitis. Pt states  fell on pt and pt's r calf landed on a brick. Went to  and was prompted to ED. Pt with erythematous and raised skin on RLE. 1 week ago had out-patient sonogram that indicated no blood clot. Denies fevers, chills, chest pain, shortness of breath, nausea, vomiting.    Vital Signs Last 24 Hrs  T(C): 36.8 (23 Jun 2022 13:14), Max: 36.8 (23 Jun 2022 13:14)  T(F): 98.3 (23 Jun 2022 13:14), Max: 98.3 (23 Jun 2022 13:14)  HR: 115 (23 Jun 2022 13:14) (115 - 115)  BP: 148/101 (23 Jun 2022 13:14) (148/101 - 148/101)  BP(mean): 113 (23 Jun 2022 13:14) (113 - 113)  RR: 18 (23 Jun 2022 13:14) (18 - 18)  SpO2: 96% (23 Jun 2022 13:14) (96% - 96%)    Labs:                              13.3   7.83  )-----------( 372      ( 23 Jun 2022 14:23 )             40.0     CBC Full  -  ( 23 Jun 2022 14:23 )  WBC Count : 7.83 K/uL  RBC Count : 4.24 M/uL  Hemoglobin : 13.3 g/dL  Hematocrit : 40.0 %  Platelet Count - Automated : 372 K/uL  Mean Cell Volume : 94.3 fl  Mean Cell Hemoglobin : 31.4 pg  Mean Cell Hemoglobin Concentration : 33.3 gm/dL  Auto Neutrophil # : 5.31 K/uL  Auto Lymphocyte # : 1.35 K/uL  Auto Monocyte # : 0.99 K/uL  Auto Eosinophil # : 0.11 K/uL  Auto Basophil # : 0.03 K/uL  Auto Neutrophil % : 67.9 %  Auto Lymphocyte % : 17.2 %  Auto Monocyte % : 12.6 %  Auto Eosinophil % : 1.4 %  Auto Basophil % : 0.4 %    06-23    138  |  106  |  13  ----------------------------<  134<H>  4.2   |  28  |  0.82    Ca    9.3      23 Jun 2022 14:23    TPro  7.6  /  Alb  3.7  /  TBili  0.5  /  DBili  x   /  AST  9<L>  /  ALT  21  /  AlkPhos  117  06-23    LIVER FUNCTIONS - ( 23 Jun 2022 14:23 )  Alb: 3.7 g/dL / Pro: 7.6 gm/dL / ALK PHOS: 117 U/L / ALT: 21 U/L / AST: 9 U/L / GGT: x           PT/INR - ( 23 Jun 2022 14:23 )   PT: 12.3 sec;   INR: 1.06 ratio         PTT - ( 23 Jun 2022 14:23 )  PTT:30.5 sec    Physical Exam:  Pt is AAOx3  General: Well developed, in no acute distress.   Chest: Lungs clear, no rales, no rhonchi, no wheezes.   Heart: RR, no murmurs, no rubs, no gallops.   Abdomen: Soft, no tenderness, nondistended, no masses, BS normal.    Back: Normal curvature, no tenderness.   Neuro: Physiological, no localizing findings.   Extremities: RLE posterior leg with severe erythema, warm to touch, tender to palpation, no overlying skin necrosis, no visible hematoma, no fluctuance, no notable abscess. sensorimotor intact. pain is not out of proportion to exam.  Extremities: Warm, well perfused, no edema, Pulses intact      ACC: [id number] [facility]                          PROCEDURE DATE:  06/23/2022          INTERPRETATION:  CLINICAL INFORMATION: Rule out abscess. Pain and   erythema in the right calf    TECHNIQUE: A targeted ultrasound of the area of concern in the right calf   was performed utilizing grayscale and color flow Doppler.    COMPARISON: None    FINDINGS: There is an irregular shaped elongated fluid collection   extending from the upper to the mid calf and measuring 9.9 x 2.0 x 2.0   cm. located between 0.9-1.5 cm from the skin surface.    IMPRESSION:  Right calf fluid collection as described.

## 2022-06-23 NOTE — H&P ADULT - HISTORY OF PRESENT ILLNESS
HPI:  82F w/PMH GERD, presents c/o RLE calf pain, swelling and redness x18 days. Initial incident incurred when pt's  tripped and fell on her, as she fell, her calf hit the edge of a brick. They were evaluated in ED at that time w/ no significant findings. 1 week later, she went to see her PA and sent for doppler which reportedly revealed hematoma. Pt, however, continued to have pain and redness, taking 1 oxy at night for pain.  Yesterday, while at PT, she was instructed to come to ED as her leg appeared very inflamed and pain was not improving. Pt denies fever/chills, n/v/d, abd pain, cp, sob, acute cough, dysuria.      In ED, labs ok, LA 1.4, UA neg, HR 100s, evaluated by surgery (note reviewed), given iv zosyn, vanco  RLE doppler There is an irregular shaped elongated fluid collection extending from the upper to the mid calf and measuring 9.9 x 2.0 x 2.0 cm. located between 0.9-1.5 cm from the skin surface.    PAST MEDICAL & SURGICAL HISTORY:  Kidney stones  GERD (gastroesophageal reflux disease)  s/p Partial and TKA left, Rt TKA      Review of Systems:   CONSTITUTIONAL: No fever.  EYES: No eye pain or discharge.  ENMT:  No sinus or throat pain  NECK: No pain or stiffness  RESPIRATORY: No cough, wheezing, chills or hemoptysis; No shortness of breath  CARDIOVASCULAR: No chest pain, palpitations, dizziness, or leg swelling  GASTROINTESTINAL: No abdominal or epigastric pain. No nausea, vomiting, or hematemesis; No diarrhea or constipation. No melena or hematochezia.  GENITOURINARY: No dysuria or incontinence  NEUROLOGICAL: No headaches, memory loss, loss of strength, numbness, or tremors  SKIN: No rashes.  MUSCULOSKELETAL: No joint pain or swelling; No muscle, back, or extremity pain  PSYCHIATRIC: No depression, anxiety, mood swings, or difficulty sleeping    Social History:   ind ADLs  lives w/   denies smoking/etoh    FAMILY HISTORY:  denies any known history    MEDICATIONS  (STANDING):  acetaminophen     Tablet .. 1000 milliGRAM(s) Oral every 8 hours  naloxone Injectable 0.4 milliGRAM(s) IV Push once  pantoprazole    Tablet 40 milliGRAM(s) Oral before breakfast  piperacillin/tazobactam IVPB.. 3.375 Gram(s) IV Intermittent every 6 hours  polyethylene glycol 3350 17 Gram(s) Oral daily  senna 2 Tablet(s) Oral at bedtime  vancomycin  IVPB 1000 milliGRAM(s) IV Intermittent every 12 hours    MEDICATIONS  (PRN):  bisacodyl 5 milliGRAM(s) Oral daily PRN Constipation  ondansetron Injectable 4 milliGRAM(s) IV Push every 6 hours PRN Nausea  oxyCODONE    IR 5 milliGRAM(s) Oral every 4 hours PRN Moderate Pain (4 - 6)  oxyCODONE    IR 10 milliGRAM(s) Oral every 4 hours PRN Severe Pain (7 - 10)      PHYSICAL EXAM:  Vital Signs Last 24 Hrs  T(C): 36.6 (2022 21:06), Max: 36.9 (2022 20:32)  T(F): 97.8 (2022 21:06), Max: 98.5 (2022 20:32)  HR: 90 (2022 21:06) (86 - 115)  BP: 134/80 (2022 21:06) (128/95 - 148/101)  BP(mean): 104 (2022 20:32) (104 - 113)  RR: 16 (2022 21:06) (14 - 18)  SpO2: 95% (2022 21:06) (95% - 96%)  GENERAL: NAD,   HEAD:  Atraumatic, Normocephalic  EYES: EOMI, PERRLA, conjunctiva and sclera clear  ENT: normal hearing, no nasal discharge, throat clear, dentition normal  NECK: Supple, No JVD, no LAD, no thyromegaly   CHEST/LUNG: Clear to auscultation bilaterally; No wheeze, respirations unlabored  HEART: Regular rate and rhythm; No murmurs, rubs, or gallops  ABDOMEN: Soft, Nontender, Nondistended; Bowel sounds present, no HSM  EXTREMITIES:  2+ Peripheral Pulses, No clubbing, cyanosis, or edema  PSYCH: AAOx3, normal behavior  NEUROLOGY: non-focal, sensory and cn 2-12 intact, speech/language intact  SKIN: No visible rashes or lesions ++RLE calf erythema w/ large area of swelling, TTP    LABS:                        13.3   7.83  )-----------( 372      ( 2022 14:23 )             40.0     06-23    138  |  106  |  13  ----------------------------<  134<H>  4.2   |  28  |  0.82    Ca    9.3      2022 14:23    TPro  7.6  /  Alb  3.7  /  TBili  0.5  /  DBili  x   /  AST  9<L>  /  ALT  21  /  AlkPhos  117  06-23    PT/INR - ( 2022 14:23 )   PT: 12.3 sec;   INR: 1.06 ratio         PTT - ( 2022 14:23 )  PTT:30.5 sec      Urinalysis Basic - ( 2022 14:39 )    Color: Yellow / Appearance: Clear / S.010 / pH: x  Gluc: x / Ketone: Negative  / Bili: Negative / Urobili: 1   Blood: x / Protein: Negative / Nitrite: Negative   Leuk Esterase: Trace / RBC: 0-2 /HPF / WBC 3-5   Sq Epi: x / Non Sq Epi: Few / Bacteria: Few        RADIOLOGY & ADDITIONAL TESTS:    Imaging Personally Reviewed:  RLE doppler There is an irregular shaped elongated fluid collection extending from the upper to the mid calf and measuring 9.9 x 2.0 x 2.0 cm. located between 0.9-1.5 cm from the skin surface.    EKG Personally Reviewed:  n/a  Consultant(s) Notes Reviewed:      Care Discussed with Consultants/Other Providers:

## 2022-06-23 NOTE — CONSULT NOTE ADULT - ASSESSMENT
82F with posterior RLE cellulitis with likely underlying hematoma.    No evidence of skin changes indicating necrosis or underlying abscess  recommend IV abx and serial exams of RLE  IVF  trend WBC  if concern develops for underlying abscess please obtain a CT of the right lower extremity  no surgical intervention at this time, please reconsult if necessary    Plan discussed with Dr. Hamm

## 2022-06-23 NOTE — H&P ADULT - ASSESSMENT
82F w/PMH GERD, presents c/o RLE calf pain, swelling and redness x18 days.  In ED, labs ok, LA 1.4, UA neg, HR 100s, evaluated by surgery (note reviewed), given iv zosyn, vanco  RLE doppler There is an irregular shaped elongated fluid collection extending from the upper to the mid calf and measuring 9.9 x 2.0 x 2.0 cm. located between 0.9-1.5 cm from the skin surface.    #cellulitis w/ fluid collection on doppler  - appreciate surgery input  - iv zosyn, vanco  - ID cs  - check cultures, labs, monitor vitals  - prn po analgesics for pain    #PPX- low risk, ambulate

## 2022-06-23 NOTE — ED ADULT NURSE NOTE - NSICDXPASTMEDICALHX_GEN_ALL_CORE_FT
Addended by: GIL MONGE on: 3/19/2020 02:40 PM     Modules accepted: Orders     PAST MEDICAL HISTORY:  GERD (gastroesophageal reflux disease)     Kidney stones

## 2022-06-24 LAB
CULTURE RESULTS: SIGNIFICANT CHANGE UP
SPECIMEN SOURCE: SIGNIFICANT CHANGE UP

## 2022-06-24 PROCEDURE — 93010 ELECTROCARDIOGRAM REPORT: CPT

## 2022-06-24 PROCEDURE — 99233 SBSQ HOSP IP/OBS HIGH 50: CPT

## 2022-06-24 RX ORDER — CEFTRIAXONE 500 MG/1
2000 INJECTION, POWDER, FOR SOLUTION INTRAMUSCULAR; INTRAVENOUS EVERY 24 HOURS
Refills: 0 | Status: DISCONTINUED | OUTPATIENT
Start: 2022-06-24 | End: 2022-06-27

## 2022-06-24 RX ADMIN — Medication 1000 MILLIGRAM(S): at 15:14

## 2022-06-24 RX ADMIN — POLYETHYLENE GLYCOL 3350 17 GRAM(S): 17 POWDER, FOR SOLUTION ORAL at 09:30

## 2022-06-24 RX ADMIN — PIPERACILLIN AND TAZOBACTAM 25 GRAM(S): 4; .5 INJECTION, POWDER, LYOPHILIZED, FOR SOLUTION INTRAVENOUS at 06:55

## 2022-06-24 RX ADMIN — OXYCODONE HYDROCHLORIDE 5 MILLIGRAM(S): 5 TABLET ORAL at 20:19

## 2022-06-24 RX ADMIN — Medication 250 MILLIGRAM(S): at 17:23

## 2022-06-24 RX ADMIN — Medication 30 MILLILITER(S): at 17:23

## 2022-06-24 RX ADMIN — OXYCODONE HYDROCHLORIDE 5 MILLIGRAM(S): 5 TABLET ORAL at 19:49

## 2022-06-24 RX ADMIN — PANTOPRAZOLE SODIUM 40 MILLIGRAM(S): 20 TABLET, DELAYED RELEASE ORAL at 05:33

## 2022-06-24 RX ADMIN — Medication 250 MILLIGRAM(S): at 05:34

## 2022-06-24 RX ADMIN — CEFTRIAXONE 100 MILLIGRAM(S): 500 INJECTION, POWDER, FOR SOLUTION INTRAMUSCULAR; INTRAVENOUS at 15:13

## 2022-06-24 NOTE — CONSULT NOTE ADULT - ASSESSMENT
82F w/PMH GERD, presents c/o RLE calf pain, swelling and redness x18 days. Initial incident incurred when pt's  tripped and fell on her, as she fell, her calf hit the edge of a brick. They were evaluated in ED at that time w/ no significant findings. 1 week later, she went to see her PA and sent for doppler which reportedly revealed hematoma. Pt, however, continued to have pain and redness, taking 1 oxy at night for pain. 6/23 while at PT, she was instructed to come to ED as her leg appeared very inflamed and pain was not improving. Pt denies fever/chills, n/v/d, abd pain, cp, sob, acute cough, dysuria. In ED, labs ok, LA 1.4, UA neg, HR 100s, evaluated by surgery (note reviewed), given iv zosyn, vanco  RLE doppler There is an irregular shaped elongated fluid collection extending from the upper to the mid calf and measuring 9.9 x 2.0 x 2.0 cm. located between 0.9-1.5 cm from the skin surface.     1. RLE hematoma. Superimposed RLE cellulitis. S/p trauma  - agree with IV vancomycin [id number] check trough prior to 4th dose  - change zosyn to rocephin 2gm daily  - continue with abx coverage   - US reviewed  - surgical follow up noted  - tolerating abx well so far; no side effects noted  - reason for abx use and side effects reviewed with patient  - supportive care  - fu cbc    2. other issues - care per medicine     -

## 2022-06-24 NOTE — CONSULT NOTE ADULT - SUBJECTIVE AND OBJECTIVE BOX
Patient is a 82y old  Female who presents with a chief complaint of Leg pain (2022 08:52)    HPI:    82F w/PMH GERD, presents c/o RLE calf pain, swelling and redness x18 days. Initial incident incurred when pt's  tripped and fell on her, as she fell, her calf hit the edge of a brick. They were evaluated in ED at that time w/ no significant findings. 1 week later, she went to see her PA and sent for doppler which reportedly revealed hematoma. Pt, however, continued to have pain and redness, taking 1 oxy at night for pain.  while at PT, she was instructed to come to ED as her leg appeared very inflamed and pain was not improving. Pt denies fever/chills, n/v/d, abd pain, cp, sob, acute cough, dysuria. In ED, labs ok, LA 1.4, UA neg, HR 100s, evaluated by surgery (note reviewed), given iv zosyn, vanco  RLE doppler There is an irregular shaped elongated fluid collection extending from the upper to the mid calf and measuring 9.9 x 2.0 x 2.0 cm. located between 0.9-1.5 cm from the skin surface.         PAST MEDICAL & SURGICAL HISTORY:  Kidney stones  GERD (gastroesophageal reflux disease)  s/p Partial and TKA left, Rt TKA      PMH: as above  PSH: as above  Meds: per reconciliation sheet, noted below  MEDICATIONS  (STANDING):  acetaminophen     Tablet .. 1000 milliGRAM(s) Oral every 8 hours  cefTRIAXone   IVPB 2000 milliGRAM(s) IV Intermittent every 24 hours  naloxone Injectable 0.4 milliGRAM(s) IV Push once  pantoprazole    Tablet 40 milliGRAM(s) Oral before breakfast  polyethylene glycol 3350 17 Gram(s) Oral daily  senna 2 Tablet(s) Oral at bedtime  vancomycin  IVPB 1000 milliGRAM(s) IV Intermittent every 12 hours      Allergies    Cipro (Unknown)    Intolerances    morphine (Vomiting (Severe))    Social: no smoking, no alcohol, no illegal drugs; no recent travel, no exposure to TB  FAMILY HISTORY:     no history of premature cardiovascular disease in first degree relatives    ROS: the patient denies fever, no chills, no HA, no dizziness, no sore throat, no blurry vision, no CP, no palpitations, no SOB, no cough, no abdominal pain, no diarrhea, no N/V, no dysuria,+ leg pain, no claudication, no rash, no joint aches, no rectal pain or bleeding, no night sweats    All other systems reviewed and are negative    Vital Signs Last 24 Hrs  T(C): 36.6 (2022 09:04), Max: 36.9 (2022 20:32)  T(F): 97.9 (2022 09:04), Max: 98.5 (2022 20:32)  HR: 82 (2022 09:04) (82 - 115)  BP: 131/88 (2022 09:04) (128/95 - 148/101)  BP(mean): 104 (2022 20:32) (104 - 113)  RR: 17 (2022 09:04) (14 - 18)  SpO2: 97% (2022 09:04) (95% - 97%)  Daily Height in cm: 160.02 (2022 13:14)    Daily     PE:  Constitutional: NAD   HEENT: NC/AT, EOMI, PERRLA, conjunctivae clear; ears and nose atraumatic; pharynx benign  Neck: supple; thyroid not palpable  Back: no tenderness  Respiratory: respiratory effort normal; clear to auscultation  Cardiovascular: S1S2 regular, no murmurs  Abdomen: soft, not tender, not distended, positive BS; liver and spleen WNL  Genitourinary: no suprapubic tenderness  Lymphatic: no LN palpable  Musculoskeletal: no muscle tenderness, no joint swelling or tenderness  Extremities: no pedal edema R calf hematoma, erythema, warmth, tenderness   Neurological/ Psychiatric: AxOx3, Judgement and insight normal;  moving all extremities  Skin: no rashes; no palpable lesions    Labs: all available labs reviewed                        13.3   7.83  )-----------( 372      ( 2022 14:23 )             40.0     06-23    138  |  106  |  13  ----------------------------<  134<H>  4.2   |  28  |  0.82    Ca    9.3      2022 14:23    TPro  7.6  /  Alb  3.7  /  TBili  0.5  /  DBili  x   /  AST  9<L>  /  ALT  21  /  AlkPhos  117       LIVER FUNCTIONS - ( 2022 14:23 )  Alb: 3.7 g/dL / Pro: 7.6 gm/dL / ALK PHOS: 117 U/L / ALT: 21 U/L / AST: 9 U/L / GGT: x           Urinalysis Basic - ( 2022 14:39 )    Color: Yellow / Appearance: Clear / S.010 / pH: x  Gluc: x / Ketone: Negative  / Bili: Negative / Urobili: 1   Blood: x / Protein: Negative / Nitrite: Negative   Leuk Esterase: Trace / RBC: 0-2 /HPF / WBC 3-5   Sq Epi: x / Non Sq Epi: Few / Bacteria: Few          Radiology: all available radiological tests reviewed  < from: US Extremity Nonvasc Limited, Right (22 @ 15:21) >    ACC: [id number] EXAM:  US NONVASC EXT LTD RT                          PROCEDURE DATE:  2022          INTERPRETATION:  CLINICAL INFORMATION: Rule out abscess. Pain and   erythema in the right calf    TECHNIQUE: A targeted ultrasound of the area of concern in the right calf   was performed utilizing grayscale and color flow Doppler.    COMPARISON: None    FINDINGS: There is an irregular shaped elongated fluid collection   extending from the upper to the mid calf and measuring 9.9 x 2.0 x 2.0   cm. located between 0.9-1.5 cm from the skin surface.    IMPRESSION:  Right calf fluid collection as described.    --- End of Report ---      < end of copied text >  < from: Xray Tibia + Fibula 2 Views, Right (22 @ 23:25) >    ACC: [id number] EXAM:  XR TIB FIB AP LAT 2 VIEWS RT                          PROCEDURE DATE:  2022          INTERPRETATION:  Frontal and lateral views of the right lower leg were   performed for history of pain following fall.    There are no prior studies for comparison.    There is no acute fracture or dislocation. There is a right knee   prosthesis with no sign of hardware loosening, disruption or   displacement. Articulation of the right ankle is grossly unremarkable.   There is no boneerosion or destruction. No penetrating injury or   radiopaque nonsurgical foreign body is seen.    IMPRESSION: No acute fracture or dislocation.  Right knee prosthesis in situ with no signs of disruption.    --- End of Report ---        < end of copied text >    Advanced directives addressed: full resuscitation

## 2022-06-24 NOTE — PATIENT PROFILE ADULT - FALL HARM RISK - RISK INTERVENTIONS
Communicate Fall Risk and Risk Factors to all staff, patient, and family/Monitor for mental status changes/Monitor gait and stability/Reinforce activity limits and safety measures with patient and family/Use of alarms - bed, chair and/or voice tab/Visual Cue: Yellow wristband/Bed in lowest position, wheels locked, appropriate side rails in place/Call bell, personal items and telephone in reach/Instruct patient to call for assistance before getting out of bed or chair/Non-slip footwear when patient is out of bed/[place] to call system/Physically safe environment - no spills, clutter or unnecessary equipment/Purposeful Proactive Rounding/Room/bathroom lighting operational, light cord in reach

## 2022-06-25 LAB
ANION GAP SERPL CALC-SCNC: 3 MMOL/L — LOW (ref 5–17)
BUN SERPL-MCNC: 12 MG/DL — SIGNIFICANT CHANGE UP (ref 7–23)
CALCIUM SERPL-MCNC: 9 MG/DL — SIGNIFICANT CHANGE UP (ref 8.5–10.1)
CHLORIDE SERPL-SCNC: 107 MMOL/L — SIGNIFICANT CHANGE UP (ref 96–108)
CO2 SERPL-SCNC: 28 MMOL/L — SIGNIFICANT CHANGE UP (ref 22–31)
CREAT SERPL-MCNC: 0.67 MG/DL — SIGNIFICANT CHANGE UP (ref 0.5–1.3)
EGFR: 88 ML/MIN/1.73M2 — SIGNIFICANT CHANGE UP
GLUCOSE SERPL-MCNC: 98 MG/DL — SIGNIFICANT CHANGE UP (ref 70–99)
HCT VFR BLD CALC: 37.8 % — SIGNIFICANT CHANGE UP (ref 34.5–45)
HGB BLD-MCNC: 12.4 G/DL — SIGNIFICANT CHANGE UP (ref 11.5–15.5)
MCHC RBC-ENTMCNC: 30.8 PG — SIGNIFICANT CHANGE UP (ref 27–34)
MCHC RBC-ENTMCNC: 32.8 GM/DL — SIGNIFICANT CHANGE UP (ref 32–36)
MCV RBC AUTO: 93.8 FL — SIGNIFICANT CHANGE UP (ref 80–100)
PLATELET # BLD AUTO: 345 K/UL — SIGNIFICANT CHANGE UP (ref 150–400)
POTASSIUM SERPL-MCNC: 4.1 MMOL/L — SIGNIFICANT CHANGE UP (ref 3.5–5.3)
POTASSIUM SERPL-SCNC: 4.1 MMOL/L — SIGNIFICANT CHANGE UP (ref 3.5–5.3)
RBC # BLD: 4.03 M/UL — SIGNIFICANT CHANGE UP (ref 3.8–5.2)
RBC # FLD: 12.3 % — SIGNIFICANT CHANGE UP (ref 10.3–14.5)
SODIUM SERPL-SCNC: 138 MMOL/L — SIGNIFICANT CHANGE UP (ref 135–145)
VANCOMYCIN TROUGH SERPL-MCNC: 13.8 UG/ML — SIGNIFICANT CHANGE UP (ref 10–20)
VANCOMYCIN TROUGH SERPL-MCNC: 15.3 UG/ML — SIGNIFICANT CHANGE UP (ref 10–20)
WBC # BLD: 5.25 K/UL — SIGNIFICANT CHANGE UP (ref 3.8–10.5)
WBC # FLD AUTO: 5.25 K/UL — SIGNIFICANT CHANGE UP (ref 3.8–10.5)

## 2022-06-25 PROCEDURE — 99233 SBSQ HOSP IP/OBS HIGH 50: CPT

## 2022-06-25 RX ORDER — SIMETHICONE 80 MG/1
80 TABLET, CHEWABLE ORAL ONCE
Refills: 0 | Status: COMPLETED | OUTPATIENT
Start: 2022-06-25 | End: 2022-06-25

## 2022-06-25 RX ADMIN — Medication 1000 MILLIGRAM(S): at 23:43

## 2022-06-25 RX ADMIN — POLYETHYLENE GLYCOL 3350 17 GRAM(S): 17 POWDER, FOR SOLUTION ORAL at 10:51

## 2022-06-25 RX ADMIN — Medication 30 MILLILITER(S): at 21:38

## 2022-06-25 RX ADMIN — CEFTRIAXONE 100 MILLIGRAM(S): 500 INJECTION, POWDER, FOR SOLUTION INTRAMUSCULAR; INTRAVENOUS at 16:18

## 2022-06-25 RX ADMIN — Medication 1000 MILLIGRAM(S): at 06:51

## 2022-06-25 RX ADMIN — Medication 250 MILLIGRAM(S): at 18:29

## 2022-06-25 RX ADMIN — PANTOPRAZOLE SODIUM 40 MILLIGRAM(S): 20 TABLET, DELAYED RELEASE ORAL at 10:51

## 2022-06-25 RX ADMIN — SIMETHICONE 80 MILLIGRAM(S): 80 TABLET, CHEWABLE ORAL at 14:43

## 2022-06-25 RX ADMIN — Medication 1000 MILLIGRAM(S): at 16:24

## 2022-06-25 RX ADMIN — Medication 1000 MILLIGRAM(S): at 21:37

## 2022-06-25 RX ADMIN — SENNA PLUS 2 TABLET(S): 8.6 TABLET ORAL at 21:37

## 2022-06-25 RX ADMIN — Medication 250 MILLIGRAM(S): at 06:52

## 2022-06-26 PROCEDURE — 99232 SBSQ HOSP IP/OBS MODERATE 35: CPT

## 2022-06-26 RX ADMIN — Medication 250 MILLIGRAM(S): at 17:47

## 2022-06-26 RX ADMIN — Medication 250 MILLIGRAM(S): at 05:11

## 2022-06-26 RX ADMIN — SENNA PLUS 2 TABLET(S): 8.6 TABLET ORAL at 22:11

## 2022-06-26 RX ADMIN — PANTOPRAZOLE SODIUM 40 MILLIGRAM(S): 20 TABLET, DELAYED RELEASE ORAL at 05:12

## 2022-06-26 RX ADMIN — Medication 1000 MILLIGRAM(S): at 22:10

## 2022-06-26 RX ADMIN — Medication 30 MILLILITER(S): at 22:11

## 2022-06-26 RX ADMIN — Medication 1000 MILLIGRAM(S): at 15:01

## 2022-06-26 RX ADMIN — CEFTRIAXONE 100 MILLIGRAM(S): 500 INJECTION, POWDER, FOR SOLUTION INTRAMUSCULAR; INTRAVENOUS at 15:00

## 2022-06-26 RX ADMIN — Medication 1000 MILLIGRAM(S): at 05:12

## 2022-06-27 ENCOUNTER — TRANSCRIPTION ENCOUNTER (OUTPATIENT)
Age: 82
End: 2022-06-27

## 2022-06-27 VITALS
TEMPERATURE: 98 F | DIASTOLIC BLOOD PRESSURE: 85 MMHG | OXYGEN SATURATION: 98 % | SYSTOLIC BLOOD PRESSURE: 146 MMHG | HEART RATE: 72 BPM | RESPIRATION RATE: 18 BRPM

## 2022-06-27 PROCEDURE — 99239 HOSP IP/OBS DSCHRG MGMT >30: CPT

## 2022-06-27 RX ORDER — ACETAMINOPHEN 500 MG
2 TABLET ORAL
Qty: 0 | Refills: 0 | DISCHARGE
Start: 2022-06-27

## 2022-06-27 RX ORDER — OMEPRAZOLE 10 MG/1
1 CAPSULE, DELAYED RELEASE ORAL
Qty: 0 | Refills: 0 | DISCHARGE

## 2022-06-27 RX ORDER — IPRATROPIUM BROMIDE 21 MCG
2 AEROSOL, SPRAY (ML) NASAL
Qty: 0 | Refills: 0 | DISCHARGE

## 2022-06-27 RX ORDER — CEFUROXIME AXETIL 250 MG
1 TABLET ORAL
Qty: 14 | Refills: 0
Start: 2022-06-27 | End: 2022-07-03

## 2022-06-27 RX ADMIN — Medication 1000 MILLIGRAM(S): at 05:52

## 2022-06-27 RX ADMIN — Medication 250 MILLIGRAM(S): at 05:52

## 2022-06-27 NOTE — PROGRESS NOTE ADULT - ASSESSMENT
82F with posterior RLE cellulitis with likely underlying hematoma.    No evidence of skin changes indicating necrosis or underlying abscess  c/w IV abx and serial exams of RLE  IVF  trend WBC    Plan discussed with Dr. Maxwell 
82F w/PMH GERD, presents c/o RLE calf pain, swelling and redness x18 days. Initial incident incurred when pt's  tripped and fell on her, as she fell, her calf hit the edge of a brick. They were evaluated in ED at that time w/ no significant findings. 1 week later, she went to see her PA and sent for doppler which reportedly revealed hematoma. Pt, however, continued to have pain and redness, taking 1 oxy at night for pain. 6/23 while at PT, she was instructed to come to ED as her leg appeared very inflamed and pain was not improving. Pt denies fever/chills, n/v/d, abd pain, cp, sob, acute cough, dysuria. In ED, labs ok, LA 1.4, UA neg, HR 100s, evaluated by surgery (note reviewed), given iv zosyn, vanco  RLE doppler There is an irregular shaped elongated fluid collection extending from the upper to the mid calf and measuring 9.9 x 2.0 x 2.0 cm. located between 0.9-1.5 cm from the skin surface.     1. RLE hematoma. Superimposed RLE cellulitis. S/p trauma  - on IV vancomycin [id number] trough wnl #4-5   - on rocephin 2gm daily #4  - dc with oral ceftin 500mg BID complete 7-10 day course   - continue with abx coverage   - US reviewed  - surgical follow up noted  - tolerating abx well so far; no side effects noted  - reason for abx use and side effects reviewed with patient  - supportive care  - fu cbc    2. other issues - care per medicine     - 
82F with posterior RLE cellulitis with likely underlying hematoma.    No evidence of skin changes indicating necrosis or underlying abscess  c/w IV abx and serial exams of RLE  IVF  trend WBC    Plan discussed with Dr. Maxwell 
82F with posterior RLE cellulitis with likely underlying hematoma.    No evidence of skin changes indicating necrosis or underlying abscess  c/w IV abx and serial exams of RLE  IVF  trend WBC    Plan discussed with Dr. Hamm

## 2022-06-27 NOTE — DISCHARGE NOTE PROVIDER - NSDCCPCAREPLAN_GEN_ALL_CORE_FT
PRINCIPAL DISCHARGE DIAGNOSIS  Diagnosis: Cellulitis of right lower extremity  Assessment and Plan of Treatment: Continue ceftin as ordered. Follow up with Dr Zimmer      SECONDARY DISCHARGE DIAGNOSES  Diagnosis: Calf swelling  Assessment and Plan of Treatment:

## 2022-06-27 NOTE — DISCHARGE NOTE NURSING/CASE MANAGEMENT/SOCIAL WORK - PATIENT PORTAL LINK FT
You can access the FollowMyHealth Patient Portal offered by [facility] by registering at the following website: [email or web address]. By joining [facility]s FollowMyHealth portal, you will also be able to view your health information using other applications (apps) compatible with our system.

## 2022-06-27 NOTE — DISCHARGE NOTE PROVIDER - NSDCMRMEDTOKEN_GEN_ALL_CORE_FT
acetaminophen 500 mg oral tablet: 2 tab(s) orally every 8 hours  cefuroxime 500 mg oral tablet: 1 tab(s) orally 2 times a day for 7 days

## 2022-06-27 NOTE — DISCHARGE NOTE PROVIDER - NSDCFUSCHEDAPPT_GEN_ALL_CORE_FT
Jatinder Blackmon  [facility] Physician [facility]  UROLOGY [address] [facility]  Scheduled Appointment: 08/08/2022

## 2022-06-27 NOTE — PROGRESS NOTE ADULT - SUBJECTIVE AND OBJECTIVE BOX
82F w/PMH GERD, presents c/o RLE calf pain, swelling and redness x18 days. Initial incident incurred when pt's  tripped and fell on her, as she fell, her calf hit the edge of a brick. They were evaluated in ED at that time w/ no significant findings. 1 week later, she went to see her PA and sent for doppler which reportedly revealed hematoma. Pt, however, continued to have pain and redness, taking 1 oxy at night for pain.  Yesterday, while at PT, she was instructed to come to ED as her leg appeared very inflamed and pain was not improving. Pt denies fever/chills, n/v/d, abd pain, cp, sob, acute cough, dysuria.    In ED, labs ok, LA 1.4, UA neg, HR 100s, evaluated by surgery (note reviewed), given iv zosyn, vanco  RLE doppler There is an irregular shaped elongated fluid collection extending from the upper to the mid calf and measuring 9.9 x 2.0 x 2.0 cm. located between 0.9-1.5 cm from the skin surface.      22: Patient seen and examined. RLE swelling and redness improving as per patient. Discussed with patient regarding management and d/c plan.   22: No new issues.       Vital Signs Last 24 Hrs  T(C): 36.6 (2022 07:31), Max: 36.8 (2022 15:59)  T(F): 97.9 (2022 07:31), Max: 98.2 (2022 15:59)  HR: 70 (2022 07:31) (70 - 86)  BP: 135/73 (2022 07:31) (121/80 - 135/73)  BP(mean): --  RR: 17 (2022 07:31) (17 - 18)  SpO2: 98% (2022 07:31) (94% - 98%)        GENERAL: NAD,   HEAD:  Atraumatic, Normocephalic  EYES: EOMI, PERRLA, conjunctiva and sclera clear  ENT: normal hearing, no nasal discharge, throat clear, dentition normal  NECK: Supple, No JVD, no LAD, no thyromegaly   CHEST/LUNG: Clear to auscultation bilaterally; No wheeze, respirations unlabored  HEART: Regular rate and rhythm; No murmurs, rubs, or gallops  ABDOMEN: Soft, Nontender, Nondistended; Bowel sounds present, no HSM  EXTREMITIES:  2+ Peripheral Pulses, No clubbing, cyanosis, or edema  PSYCH: AAOx3, normal behavior  NEUROLOGY: non-focal, sensory and cn 2-12 intact, speech/language intact  SKIN: No visible rashes or lesions ++RLE calf erythema w/ large area of swelling, TTP                            12.4   5.25  )-----------( 345      ( 2022 05:25 )             37.8     2022 05:25    138    |  107    |  12     ----------------------------<  98     4.1     |  28     |  0.67     Ca    9.0        2022 05:25    TPro  7.6    /  Alb  3.7    /  TBili  0.5    /  DBili  x      /  AST  9      /  ALT  21     /  AlkPhos  117    2022 14:23    LIVER FUNCTIONS - ( 2022 14:23 )  Alb: 3.7 g/dL / Pro: 7.6 gm/dL / ALK PHOS: 117 U/L / ALT: 21 U/L / AST: 9 U/L / GGT: x           PT/INR - ( 2022 14:23 )   PT: 12.3 sec;   INR: 1.06 ratio         PTT - ( 2022 14:23 )  PTT:30.5 sec  CAPILLARY BLOOD GLUCOSE            Urinalysis Basic - ( 2022 14:39 )    Color: Yellow / Appearance: Clear / S.010 / pH: x  Gluc: x / Ketone: Negative  / Bili: Negative / Urobili: 1   Blood: x / Protein: Negative / Nitrite: Negative   Leuk Esterase: Trace / RBC: 0-2 /HPF / WBC 3-5   Sq Epi: x / Non Sq Epi: Few / Bacteria: Few          MEDICATIONS  (STANDING):  acetaminophen     Tablet .. 1000 milliGRAM(s) Oral every 8 hours  cefTRIAXone   IVPB 2000 milliGRAM(s) IV Intermittent every 24 hours  naloxone Injectable 0.4 milliGRAM(s) IV Push once  pantoprazole    Tablet 40 milliGRAM(s) Oral before breakfast  polyethylene glycol 3350 17 Gram(s) Oral daily  senna 2 Tablet(s) Oral at bedtime  simethicone 80 milliGRAM(s) Chew once  vancomycin  IVPB 1000 milliGRAM(s) IV Intermittent every 12 hours    MEDICATIONS  (PRN):  aluminum hydroxide/magnesium hydroxide/simethicone Suspension 30 milliLiter(s) Oral every 6 hours PRN Dyspepsia  bisacodyl 5 milliGRAM(s) Oral daily PRN Constipation  ondansetron Injectable 4 milliGRAM(s) IV Push every 6 hours PRN Nausea  oxyCODONE    IR 5 milliGRAM(s) Oral every 4 hours PRN Moderate Pain (4 - 6)  oxyCODONE    IR 10 milliGRAM(s) Oral every 4 hours PRN Severe Pain (7 - 10)          Assessment:  · Assessment	  82F w/PMH GERD, presents c/o RLE calf pain, swelling and redness x18 days.  In ED, labs ok, LA 1.4, UA neg, HR 100s, evaluated by surgery (note reviewed), given iv zosyn, vanco  RLE doppler There is an irregular shaped elongated fluid collection extending from the upper to the mid calf and measuring 9.9 x 2.0 x 2.0 cm. located between 0.9-1.5 cm from the skin surface.    #cellulitis w/ fluid collection, possibly hematoma  Resolving  No abscess  - appreciate surgery input  -Continue IV antibiotics-IV rocephin and IV vanco  - Dr Fulton consult appreciated  - prn po analgesics for pain    #PPX-   
82F w/PMH GERD, presents c/o RLE calf pain, swelling and redness x18 days. Initial incident incurred when pt's  tripped and fell on her, as she fell, her calf hit the edge of a brick. They were evaluated in ED at that time w/ no significant findings. 1 week later, she went to see her PA and sent for doppler which reportedly revealed hematoma. Pt, however, continued to have pain and redness, taking 1 oxy at night for pain.  Yesterday, while at PT, she was instructed to come to ED as her leg appeared very inflamed and pain was not improving. Pt denies fever/chills, n/v/d, abd pain, cp, sob, acute cough, dysuria.    In ED, labs ok, LA 1.4, UA neg, HR 100s, evaluated by surgery (note reviewed), given iv zosyn, vanco  RLE doppler There is an irregular shaped elongated fluid collection extending from the upper to the mid calf and measuring 9.9 x 2.0 x 2.0 cm. located between 0.9-1.5 cm from the skin surface.      22: Patient seen and examined. RLE swelling and redness improving as per patient. Discussed with patient regarding management and d/c plan.         Vital Signs Last 24 Hrs  T(C): 36.6 (2022 09:04), Max: 36.9 (2022 20:32)  T(F): 97.9 (2022 09:04), Max: 98.5 (2022 20:32)  HR: 82 (2022 09:04) (82 - 90)  BP: 131/88 (2022 09:04) (128/95 - 134/80)  BP(mean): 104 (2022 20:32) (104 - 104)  RR: 17 (2022 09:04) (14 - 17)  SpO2: 97% (2022 09:04) (95% - 97%)        GENERAL: NAD,   HEAD:  Atraumatic, Normocephalic  EYES: EOMI, PERRLA, conjunctiva and sclera clear  ENT: normal hearing, no nasal discharge, throat clear, dentition normal  NECK: Supple, No JVD, no LAD, no thyromegaly   CHEST/LUNG: Clear to auscultation bilaterally; No wheeze, respirations unlabored  HEART: Regular rate and rhythm; No murmurs, rubs, or gallops  ABDOMEN: Soft, Nontender, Nondistended; Bowel sounds present, no HSM  EXTREMITIES:  2+ Peripheral Pulses, No clubbing, cyanosis, or edema  PSYCH: AAOx3, normal behavior  NEUROLOGY: non-focal, sensory and cn 2-12 intact, speech/language intact  SKIN: No visible rashes or lesions ++RLE calf erythema w/ large area of swelling, TTP                                13.3   7.83  )-----------( 372      ( 2022 14:23 )             40.0     2022 14:23    138    |  106    |  13     ----------------------------<  134    4.2     |  28     |  0.82     Ca    9.3        2022 14:23    TPro  7.6    /  Alb  3.7    /  TBili  0.5    /  DBili  x      /  AST  9      /  ALT  21     /  AlkPhos  117    2022 14:23    LIVER FUNCTIONS - ( 2022 14:23 )  Alb: 3.7 g/dL / Pro: 7.6 gm/dL / ALK PHOS: 117 U/L / ALT: 21 U/L / AST: 9 U/L / GGT: x           PT/INR - ( 2022 14:23 )   PT: 12.3 sec;   INR: 1.06 ratio         PTT - ( 2022 14:23 )  PTT:30.5 sec  CAPILLARY BLOOD GLUCOSE            Urinalysis Basic - ( 2022 14:39 )    Color: Yellow / Appearance: Clear / S.010 / pH: x  Gluc: x / Ketone: Negative  / Bili: Negative / Urobili: 1   Blood: x / Protein: Negative / Nitrite: Negative   Leuk Esterase: Trace / RBC: 0-2 /HPF / WBC 3-5   Sq Epi: x / Non Sq Epi: Few / Bacteria: Few      MEDICATIONS  (STANDING):  acetaminophen     Tablet .. 1000 milliGRAM(s) Oral every 8 hours  cefTRIAXone   IVPB 2000 milliGRAM(s) IV Intermittent every 24 hours  naloxone Injectable 0.4 milliGRAM(s) IV Push once  pantoprazole    Tablet 40 milliGRAM(s) Oral before breakfast  polyethylene glycol 3350 17 Gram(s) Oral daily  senna 2 Tablet(s) Oral at bedtime  vancomycin  IVPB 1000 milliGRAM(s) IV Intermittent every 12 hours    MEDICATIONS  (PRN):  bisacodyl 5 milliGRAM(s) Oral daily PRN Constipation  ondansetron Injectable 4 milliGRAM(s) IV Push every 6 hours PRN Nausea  oxyCODONE    IR 5 milliGRAM(s) Oral every 4 hours PRN Moderate Pain (4 - 6)  oxyCODONE    IR 10 milliGRAM(s) Oral every 4 hours PRN Severe Pain (7 - 10)        Assessment:  · Assessment	  82F w/PMH GERD, presents c/o RLE calf pain, swelling and redness x18 days.  In ED, labs ok, LA 1.4, UA neg, HR 100s, evaluated by surgery (note reviewed), given iv zosyn, vanco  RLE doppler There is an irregular shaped elongated fluid collection extending from the upper to the mid calf and measuring 9.9 x 2.0 x 2.0 cm. located between 0.9-1.5 cm from the skin surface.    #cellulitis w/ fluid collection on doppler  - appreciate surgery input  -Continue IV antibiotics-IV rocephin and IV vanco  - Dr Fulton consult appreciated  - prn po analgesics for pain    #PPX- low risk, ambulate  
82F w/PMH GERD, presents c/o RLE calf pain, swelling and redness x18 days. Initial incident incurred when pt's  tripped and fell on her, as she fell, her calf hit the edge of a brick. They were evaluated in ED at that time w/ no significant findings. 1 week later, she went to see her PA and sent for doppler which reportedly revealed hematoma. Pt, however, continued to have pain and redness, taking 1 oxy at night for pain.  Yesterday, while at PT, she was instructed to come to ED as her leg appeared very inflamed and pain was not improving. Pt denies fever/chills, n/v/d, abd pain, cp, sob, acute cough, dysuria.    In ED, labs ok, LA 1.4, UA neg, HR 100s, evaluated by surgery (note reviewed), given iv zosyn, vanco  RLE doppler There is an irregular shaped elongated fluid collection extending from the upper to the mid calf and measuring 9.9 x 2.0 x 2.0 cm. located between 0.9-1.5 cm from the skin surface.      22: Patient seen and examined. RLE swelling and redness improving as per patient. Discussed with patient regarding management and d/c plan.   22: No new issues.   22: Still with redness and pain, but improving      Vital Signs Last 24 Hrs  T(C): 36.7 (2022 09:18), Max: 36.7 (2022 16:30)  T(F): 98 (2022 09:18), Max: 98.1 (2022 16:30)  HR: 74 (2022 09:18) (74 - 85)  BP: 158/74 (2022 09:18) (116/56 - 158/74)  BP(mean): 69 (2022 23:52) (69 - 69)  RR: 18 (2022 09:18) (18 - 18)  SpO2: 100% (2022 09:18) (100% - 100%)        GENERAL: NAD,   HEAD:  Atraumatic, Normocephalic  EYES: EOMI, PERRLA, conjunctiva and sclera clear  ENT: normal hearing, no nasal discharge, throat clear, dentition normal  NECK: Supple, No JVD, no LAD, no thyromegaly   CHEST/LUNG: Clear to auscultation bilaterally; No wheeze, respirations unlabored  HEART: Regular rate and rhythm; No murmurs, rubs, or gallops  ABDOMEN: Soft, Nontender, Nondistended; Bowel sounds present, no HSM  EXTREMITIES:  2+ Peripheral Pulses, No clubbing, cyanosis, or edema  PSYCH: AAOx3, normal behavior  NEUROLOGY: non-focal, sensory and cn 2-12 intact, speech/language intact  SKIN: No visible rashes or lesions ++RLE calf erythema w/ large area of swelling, TTP                            12.4   5.25  )-----------( 345      ( 2022 05:25 )             37.8     2022 05:25    138    |  107    |  12     ----------------------------<  98     4.1     |  28     |  0.67     Ca    9.0        2022 05:25    TPro  7.6    /  Alb  3.7    /  TBili  0.5    /  DBili  x      /  AST  9      /  ALT  21     /  AlkPhos  117    2022 14:23    LIVER FUNCTIONS - ( 2022 14:23 )  Alb: 3.7 g/dL / Pro: 7.6 gm/dL / ALK PHOS: 117 U/L / ALT: 21 U/L / AST: 9 U/L / GGT: x           PT/INR - ( 2022 14:23 )   PT: 12.3 sec;   INR: 1.06 ratio         PTT - ( 2022 14:23 )  PTT:30.5 sec  CAPILLARY BLOOD GLUCOSE            Urinalysis Basic - ( 2022 14:39 )    Color: Yellow / Appearance: Clear / S.010 / pH: x  Gluc: x / Ketone: Negative  / Bili: Negative / Urobili: 1   Blood: x / Protein: Negative / Nitrite: Negative   Leuk Esterase: Trace / RBC: 0-2 /HPF / WBC 3-5   Sq Epi: x / Non Sq Epi: Few / Bacteria: Few          MEDICATIONS  (STANDING):  acetaminophen     Tablet .. 1000 milliGRAM(s) Oral every 8 hours  cefTRIAXone   IVPB 2000 milliGRAM(s) IV Intermittent every 24 hours  naloxone Injectable 0.4 milliGRAM(s) IV Push once  pantoprazole    Tablet 40 milliGRAM(s) Oral before breakfast  polyethylene glycol 3350 17 Gram(s) Oral daily  senna 2 Tablet(s) Oral at bedtime  simethicone 80 milliGRAM(s) Chew once  vancomycin  IVPB 1000 milliGRAM(s) IV Intermittent every 12 hours    MEDICATIONS  (PRN):  aluminum hydroxide/magnesium hydroxide/simethicone Suspension 30 milliLiter(s) Oral every 6 hours PRN Dyspepsia  bisacodyl 5 milliGRAM(s) Oral daily PRN Constipation  ondansetron Injectable 4 milliGRAM(s) IV Push every 6 hours PRN Nausea  oxyCODONE    IR 5 milliGRAM(s) Oral every 4 hours PRN Moderate Pain (4 - 6)  oxyCODONE    IR 10 milliGRAM(s) Oral every 4 hours PRN Severe Pain (7 - 10)          Assessment:  · Assessment	  82F w/PMH GERD, presents c/o RLE calf pain, swelling and redness x18 days.  In ED, labs ok, LA 1.4, UA neg, HR 100s, evaluated by surgery (note reviewed), given iv zosyn, vanco  RLE doppler There is an irregular shaped elongated fluid collection extending from the upper to the mid calf and measuring 9.9 x 2.0 x 2.0 cm. located between 0.9-1.5 cm from the skin surface.    #cellulitis w/ fluid collection, possibly hematoma  Resolving  No abscess  - appreciate surgery input  -Continue IV antibiotics-IV rocephin and IV vanco  - Dr Fulton consult appreciated  - prn po analgesics for pain    #PPX- ambulate  
Pt seen and examined at bedside, no acute events. Pt states her pain has improved and the swelling has gone down. Denied fever, chills, nausea, vomiting or SOB overnight.     Vital Signs Last 24 Hrs  T(C): 36.6 (23 Jun 2022 21:06), Max: 36.9 (23 Jun 2022 20:32)  T(F): 97.8 (23 Jun 2022 21:06), Max: 98.5 (23 Jun 2022 20:32)  HR: 90 (23 Jun 2022 21:06) (86 - 115)  BP: 134/80 (23 Jun 2022 21:06) (128/95 - 148/101)  BP(mean): 104 (23 Jun 2022 20:32) (104 - 113)  RR: 16 (23 Jun 2022 21:06) (14 - 18)  SpO2: 95% (23 Jun 2022 21:06) (95% - 96%)    Labs:                              13.3   7.83  )-----------( 372      ( 23 Jun 2022 14:23 )             40.0     CBC Full  -  ( 23 Jun 2022 14:23 )  WBC Count : 7.83 K/uL  RBC Count : 4.24 M/uL  Hemoglobin : 13.3 g/dL  Hematocrit : 40.0 %  Platelet Count - Automated : 372 K/uL  Mean Cell Volume : 94.3 fl  Mean Cell Hemoglobin : 31.4 pg  Mean Cell Hemoglobin Concentration : 33.3 gm/dL  Auto Neutrophil # : 5.31 K/uL  Auto Lymphocyte # : 1.35 K/uL  Auto Monocyte # : 0.99 K/uL  Auto Eosinophil # : 0.11 K/uL  Auto Basophil # : 0.03 K/uL  Auto Neutrophil % : 67.9 %  Auto Lymphocyte % : 17.2 %  Auto Monocyte % : 12.6 %  Auto Eosinophil % : 1.4 %  Auto Basophil % : 0.4 %    06-23    138  |  106  |  13  ----------------------------<  134<H>  4.2   |  28  |  0.82    Ca    9.3      23 Jun 2022 14:23    TPro  7.6  /  Alb  3.7  /  TBili  0.5  /  DBili  x   /  AST  9<L>  /  ALT  21  /  AlkPhos  117  06-23    LIVER FUNCTIONS - ( 23 Jun 2022 14:23 )  Alb: 3.7 g/dL / Pro: 7.6 gm/dL / ALK PHOS: 117 U/L / ALT: 21 U/L / AST: 9 U/L / GGT: x           PT/INR - ( 23 Jun 2022 14:23 )   PT: 12.3 sec;   INR: 1.06 ratio         PTT - ( 23 Jun 2022 14:23 )  PTT:30.5 sec        Physical Exam:  Pt is AAOx3  General: Well developed, in no acute distress.   Chest: Lungs clear, no rales, no rhonchi, no wheezes.   Heart: RR, no murmurs, no rubs, no gallops.   Abdomen: Soft, no tenderness, nondistended, no masses, BS normal.    Back: Normal curvature, no tenderness.   Neuro: Physiological, no localizing findings.   Extremities: RLE posterior leg with improving erythema, warm to touch, less tender to palpation, no overlying skin necrosis, no visible hematoma, no fluctuance, no notable abscess. sensorimotor intact. pain is not out of proportion to exam.  Extremities: Warm, well perfused, no edema, Pulses intact
Date of service: 22 @ 11:06    pt seen and examined   feels better   R leg less redness, swelling  no fevers     ROS: no fever or chills; denies dizziness, no HA, no SOB or cough, no abdominal pain, no diarrhea or constipation; no dysuria, no urinary frequency    MEDICATIONS  (STANDING):  acetaminophen     Tablet .. 1000 milliGRAM(s) Oral every 8 hours  cefTRIAXone   IVPB 2000 milliGRAM(s) IV Intermittent every 24 hours  naloxone Injectable 0.4 milliGRAM(s) IV Push once  pantoprazole    Tablet 40 milliGRAM(s) Oral before breakfast  polyethylene glycol 3350 17 Gram(s) Oral daily  senna 2 Tablet(s) Oral at bedtime  vancomycin  IVPB 1000 milliGRAM(s) IV Intermittent every 12 hours    Vital Signs Last 24 Hrs  T(C): 36.4 (2022 07:13), Max: 36.6 (2022 16:55)  T(F): 97.6 (2022 07:13), Max: 97.8 (2022 16:55)  HR: 72 (2022 07:13) (72 - 78)  BP: 146/85 (2022 07:13) (129/77 - 146/85)  BP(mean): --  RR: 18 (2022 07:13) (17 - 18)  SpO2: 98% (2022 07:13) (96% - 98%)      PE:  Constitutional: NAD   HEENT: NC/AT, EOMI, PERRLA, conjunctivae clear; ears and nose atraumatic; pharynx benign  Neck: supple; thyroid not palpable  Back: no tenderness  Respiratory: respiratory effort normal; clear to auscultation  Cardiovascular: S1S2 regular, no murmurs  Abdomen: soft, not tender, not distended, positive BS; liver and spleen WNL  Genitourinary: no suprapubic tenderness  Lymphatic: no LN palpable  Musculoskeletal: no muscle tenderness, no joint swelling or tenderness  Extremities: no pedal edema R calf hematoma, erythema, warmth, tenderness   Neurological/ Psychiatric: AxOx3, Judgement and insight normal;  moving all extremities  Skin: no rashes; no palpable lesions    Labs: all available labs reviewed                        Vancomycin Level, Trough: 15.3 ug/mL ( @ 17:26)         LIVER FUNCTIONS - ( 2022 14:23 )  Alb: 3.7 g/dL / Pro: 7.6 gm/dL / ALK PHOS: 117 U/L / ALT: 21 U/L / AST: 9 U/L / GGT: x           Urinalysis Basic - ( 2022 14:39 )    Color: Yellow / Appearance: Clear / S.010 / pH: x  Gluc: x / Ketone: Negative  / Bili: Negative / Urobili: 1   Blood: x / Protein: Negative / Nitrite: Negative   Leuk Esterase: Trace / RBC: 0-2 /HPF / WBC 3-5   Sq Epi: x / Non Sq Epi: Few / Bacteria: Few          Radiology: all available radiological tests reviewed  < from: US Extremity Nonvasc Limited, Right (22 @ 15:21) >    ACC: [id number] EXAM:  US NONVASC EXT LTD RT                          PROCEDURE DATE:  2022          INTERPRETATION:  CLINICAL INFORMATION: Rule out abscess. Pain and   erythema in the right calf    TECHNIQUE: A targeted ultrasound of the area of concern in the right calf   was performed utilizing grayscale and color flow Doppler.    COMPARISON: None    FINDINGS: There is an irregular shaped elongated fluid collection   extending from the upper to the mid calf and measuring 9.9 x 2.0 x 2.0   cm. located between 0.9-1.5 cm from the skin surface.    IMPRESSION:  Right calf fluid collection as described.        ACC: [id number] EXAM:  XR TIB FIB AP LAT 2 VIEWS RT                          PROCEDURE DATE:  2022          INTERPRETATION:  Frontal and lateral views of the right lower leg were   performed for history of pain following fall.    There are no prior studies for comparison.    There is no acute fracture or dislocation. There is a right knee   prosthesis with no sign of hardware loosening, disruption or   displacement. Articulation of the right ankle is grossly unremarkable.   There is no boneerosion or destruction. No penetrating injury or   radiopaque nonsurgical foreign body is seen.    IMPRESSION: No acute fracture or dislocation.  Right knee prosthesis in situ with no signs of disruption.      Advanced directives addressed: full resuscitation
Pt seen and examined at bedside, no acute events. Pt states her pain has improved and the swelling has gone down. Denied fever, chills, nausea, vomiting or SOB overnight.     ICU Vital Signs Last 24 Hrs  T(C): 36.6 (25 Jun 2022 07:31), Max: 36.8 (24 Jun 2022 15:59)  T(F): 97.9 (25 Jun 2022 07:31), Max: 98.2 (24 Jun 2022 15:59)  HR: 70 (25 Jun 2022 07:31) (70 - 86)  BP: 135/73 (25 Jun 2022 07:31) (121/80 - 135/73)  BP(mean): --  ABP: --  ABP(mean): --  RR: 17 (25 Jun 2022 07:31) (17 - 18)  SpO2: 98% (25 Jun 2022 07:31) (94% - 98%)        Physical Exam:  Pt is AAOx3  General: Well developed, in no acute distress.   Chest: Lungs clear, no rales, no rhonchi, no wheezes.   Heart: RR, no murmurs, no rubs, no gallops.   Abdomen: Soft, no tenderness, nondistended, no masses, BS normal.    Back: Normal curvature, no tenderness.   Neuro: Physiological, no localizing findings.   Extremities: RLE posterior leg with improving erythema, warm to touch, less tender to palpation, no overlying skin necrosis, no visible hematoma, no fluctuance, no notable abscess. sensorimotor intact. pain is not out of proportion to exam.  Extremities: Warm, well perfused, no edema, Pulses intact                          12.4   5.25  )-----------( 345      ( 25 Jun 2022 05:25 )             37.8   06-25    138  |  107  |  12  ----------------------------<  98  4.1   |  28  |  0.67    Ca    9.0      25 Jun 2022 05:25    TPro  7.6  /  Alb  3.7  /  TBili  0.5  /  DBili  x   /  AST  9<L>  /  ALT  21  /  AlkPhos  117  06-23  PT/INR - ( 23 Jun 2022 14:23 )   PT: 12.3 sec;   INR: 1.06 ratio         PTT - ( 23 Jun 2022 14:23 )  PTT:30.5 sec
Pt seen and examined at bedside, no acute events. Pt states her pain has improved and the swelling has gone down. Denied fever, chills, nausea, vomiting or SOB overnight.     ICU Vital Signs Last 24 Hrs  T(C): 36.7 (26 Jun 2022 09:18), Max: 36.7 (25 Jun 2022 16:30)  T(F): 98 (26 Jun 2022 09:18), Max: 98.1 (25 Jun 2022 16:30)  HR: 74 (26 Jun 2022 09:18) (74 - 85)  BP: 158/74 (26 Jun 2022 09:18) (116/56 - 158/74)  BP(mean): 69 (25 Jun 2022 23:52) (69 - 69)  ABP: --  ABP(mean): --  RR: 18 (26 Jun 2022 09:18) (18 - 18)  SpO2: 100% (26 Jun 2022 09:18) (100% - 100%)      Physical Exam:  Pt is AAOx3  General: Well developed, in no acute distress.   Chest: Lungs clear, no rales, no rhonchi, no wheezes.   Heart: RR, no murmurs, no rubs, no gallops.   Abdomen: Soft, no tenderness, nondistended, no masses, BS normal.    Back: Normal curvature, no tenderness.   Neuro: Physiological, no localizing findings.   Extremities: RLE posterior leg with improving erythema, warm to touch, less tender to palpation, no overlying skin necrosis, no visible hematoma, no fluctuance, no notable abscess. sensorimotor intact. pain is not out of proportion to exam.  Extremities: Warm, well perfused, no edema, Pulses intact                             12.4   5.25  )-----------( 345      ( 25 Jun 2022 05:25 )             37.8   06-25    138  |  107  |  12  ----------------------------<  98  4.1   |  28  |  0.67    Ca    9.0      25 Jun 2022 05:25

## 2022-06-27 NOTE — DISCHARGE NOTE NURSING/CASE MANAGEMENT/SOCIAL WORK - NSDCPEFALRISK_GEN_ALL_CORE
For information on Fall & Injury Prevention, visit: [email or web address]  [email or web address]  https://www.cdc.gov/steadi/patient.html
This patient has been assessed with a concern for Malnutrition and has been determined to have a diagnosis/diagnoses of Severe protein-calorie malnutrition and Underweight/BMI < 19.    This patient is being managed with:   Diet Regular-  Supplement Feeding Modality:  Oral  Ensure Enlive Cans or Servings Per Day:  2       Frequency:  Daily  Entered: Nov 20 2020 11:09AM    Diet Regular-  Entered: Nov 17 2020  2:32PM    The following pending diet order is being considered for treatment of Severe protein-calorie malnutrition and Underweight/BMI < 19:null

## 2022-06-27 NOTE — DISCHARGE NOTE PROVIDER - CARE PROVIDER_API CALL
Lauren Zimmer)  Family Medicine  [address] [place] [address]  Phone: [phone]  Fax: [phone]  Follow Up Time:

## 2022-06-27 NOTE — DISCHARGE NOTE PROVIDER - HOSPITAL COURSE
82F w/PMH GERD, presents c/o RLE calf pain, swelling and redness x18 days. Initial incident incurred when pt's  tripped and fell on her, as she fell, her calf hit the edge of a brick. They were evaluated in ED at that time w/ no significant findings. 1 week later, she went to see her PA and sent for doppler which reportedly revealed hematoma. Pt, however, continued to have pain and redness, taking 1 oxy at night for pain.  Yesterday, while at PT, she was instructed to come to ED as her leg appeared very inflamed and pain was not improving. Pt denies fever/chills, n/v/d, abd pain, cp, sob, acute cough, dysuria.    In ED, labs ok, LA 1.4, UA neg, HR 100s, evaluated by surgery (note reviewed), given iv zosyn, vanco  RLE doppler There is an irregular shaped elongated fluid collection extending from the upper to the mid calf and measuring 9.9 x 2.0 x 2.0 cm. located between 0.9-1.5 cm from the skin surface.    Hospital course: She was admitted to medical floor. Started on IV antibiotics-IV rocephin and Vancomycin. She was seen and follwed by surgery and ID Dr Fulton. Her swelling and pain slowly improving.  Discussed with patient and  multiple times regarding management and d/c plan. Anxious to home. Seen by Dr Fulton and cleared for discharge.     GENERAL: NAD,   HEAD:  Atraumatic, Normocephalic  EYES: EOMI, PERRLA, conjunctiva and sclera clear  ENT: normal hearing, no nasal discharge, throat clear, dentition normal  NECK: Supple, No JVD, no LAD, no thyromegaly   CHEST/LUNG: Clear to auscultation bilaterally; No wheeze, respirations unlabored  HEART: Regular rate and rhythm; No murmurs, rubs, or gallops  ABDOMEN: Soft, Nontender, Nondistended; Bowel sounds present, no HSM  EXTREMITIES:  2+ Peripheral Pulses, No clubbing, cyanosis, or edema  PSYCH: AAOx3, normal behavior  NEUROLOGY: non-focal, sensory and cn 2-12 intact, speech/language intact  SKIN: No visible rashes or lesions ++RLE calf erythema w/ large area of swelling, TTP        Assessment:  · Assessment    82F w/PMH GERD, presents c/o RLE calf pain, swelling and redness x18 days.  In ED, labs ok, LA 1.4, UA neg, HR 100s, evaluated by surgery (note reviewed), given iv zosyn, vanco  RLE doppler There is an irregular shaped elongated fluid collection extending from the upper to the mid calf and measuring 9.9 x 2.0 x 2.0 cm. located between 0.9-1.5 cm from the skin surface.    #cellulitis w/ fluid collection, possibly hematoma  Resolving  No abscess  - appreciate surgery input  -On IV antibiotics-IV rocephin and IV vanco, change to po ceftin on discharge  - Dr Fulton consult and follow up appreciated     Home today  Spent more than 30 mint to prepare the discharge

## 2022-06-28 LAB
CULTURE RESULTS: SIGNIFICANT CHANGE UP
CULTURE RESULTS: SIGNIFICANT CHANGE UP
SPECIMEN SOURCE: SIGNIFICANT CHANGE UP
SPECIMEN SOURCE: SIGNIFICANT CHANGE UP

## 2022-06-29 LAB — CULTURE RESULTS: SIGNIFICANT CHANGE UP

## 2022-06-30 ENCOUNTER — APPOINTMENT (OUTPATIENT)
Dept: FAMILY MEDICINE | Facility: CLINIC | Age: 82
End: 2022-06-30

## 2022-06-30 VITALS
DIASTOLIC BLOOD PRESSURE: 82 MMHG | WEIGHT: 172 LBS | SYSTOLIC BLOOD PRESSURE: 130 MMHG | TEMPERATURE: 97 F | HEART RATE: 72 BPM | BODY MASS INDEX: 30.48 KG/M2 | HEIGHT: 63 IN | OXYGEN SATURATION: 95 %

## 2022-06-30 DIAGNOSIS — M79.89 OTHER SPECIFIED SOFT TISSUE DISORDERS: ICD-10-CM

## 2022-06-30 DIAGNOSIS — L03.119 CELLULITIS OF UNSPECIFIED PART OF LIMB: ICD-10-CM

## 2022-06-30 LAB — CULTURE RESULTS: SIGNIFICANT CHANGE UP

## 2022-06-30 PROCEDURE — 99213 OFFICE O/P EST LOW 20 MIN: CPT

## 2022-06-30 NOTE — PLAN
[FreeTextEntry1] : She will complete the course of Cefuroxime and follow up next week if the rash worsens.

## 2022-06-30 NOTE — ASSESSMENT
[FreeTextEntry1] : She has cellulitis of her right lower leg. This is improving with antibiotics. I reviewed her lab results from the hospital one of her blood cultures showed some growth in the anaerobic bottle but the other did not. I discussed this with Dr. Fulton (ID) who felt that this was a contaminant and not a true positive blood culture.

## 2022-06-30 NOTE — HISTORY OF PRESENT ILLNESS
[FreeTextEntry1] : TONI CORNELIUS is a 82 year old female here for a follow up visit.  [de-identified] : Patient is here for hospital follow up. She had an injury to her right lower leg on 6/5/22 when her  fell on her. She was seen in the office for evaluation 10 days later (6/15/22) for pain, swelling, and redness of the right calf. This was felt to be a hematoma and she had an ultrasound to rule out a DVT. The ultrasound was done on 6/15 and showed a 6 cm subcutaneous hematoma in the right calf with no evidence of DVT.\par \par One week later she was at physical therapy and the therapist felt that her leg appeared very inflamed. She was advised to go to the ER for evaluation. She had a second ultrasound which now showed a fluid collection measuring 9.9 x 2.0 x 2.0 cm. She was admitted to the hospital on 6/23 for treatment with IV antibiotics for presumed cellulitis/abscess. She was seen in consultation by ID (Dr. Fulton) and was finally discharged home on 6/27 on PO Cefuroxime.

## 2022-06-30 NOTE — PHYSICAL EXAM
[No Respiratory Distress] : no respiratory distress  [No Joint Swelling] : no joint swelling [No Focal Deficits] : no focal deficits [Normal] : affect was normal and insight and judgment were intact [de-identified] : cellulitis of right lower leg, decreased redness compared to the line drawn on her leg; central induration consistent with hematoma is mildly tender to palpation
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
## 2022-07-01 DIAGNOSIS — L03.115 CELLULITIS OF RIGHT LOWER LIMB: ICD-10-CM

## 2022-07-01 DIAGNOSIS — S80.11XA CONTUSION OF RIGHT LOWER LEG, INITIAL ENCOUNTER: ICD-10-CM
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
## 2022-07-01 DIAGNOSIS — Z88.8 ALLERGY STATUS TO OTHER DRUGS, MEDICAMENTS AND BIOLOGICAL SUBSTANCES STATUS: ICD-10-CM

## 2022-07-01 DIAGNOSIS — K21.9 GASTRO-ESOPHAGEAL REFLUX DISEASE WITHOUT ESOPHAGITIS: ICD-10-CM

## 2022-07-11 ENCOUNTER — NON-APPOINTMENT (OUTPATIENT)
Age: 82
End: 2022-07-11

## 2022-07-11 ENCOUNTER — FORM ENCOUNTER (OUTPATIENT)
Age: 82
End: 2022-07-11

## 2022-07-12 ENCOUNTER — FORM ENCOUNTER (OUTPATIENT)
Age: 82
End: 2022-07-12

## 2022-08-02 ENCOUNTER — FORM ENCOUNTER (OUTPATIENT)
Age: 82
End: 2022-08-02

## 2022-08-03 ENCOUNTER — FORM ENCOUNTER (OUTPATIENT)
Age: 82
End: 2022-08-03

## 2022-08-08 ENCOUNTER — FORM ENCOUNTER (OUTPATIENT)
Age: 82
End: 2022-08-08

## 2022-08-08 ENCOUNTER — APPOINTMENT (OUTPATIENT)
Dept: UROLOGY | Facility: CLINIC | Age: 82
End: 2022-08-08

## 2022-09-06 ENCOUNTER — FORM ENCOUNTER (OUTPATIENT)
Age: 82
End: 2022-09-06

## 2022-11-08 ENCOUNTER — APPOINTMENT (OUTPATIENT)
Dept: FAMILY MEDICINE | Facility: CLINIC | Age: 82
End: 2022-11-08

## 2022-11-08 PROCEDURE — 36415 COLL VENOUS BLD VENIPUNCTURE: CPT

## 2022-11-13 LAB
ALBUMIN SERPL ELPH-MCNC: 4.2 G/DL
ALP BLD-CCNC: 89 U/L
ALT SERPL-CCNC: 12 U/L
ANION GAP SERPL CALC-SCNC: 11 MMOL/L
AST SERPL-CCNC: 14 U/L
BASOPHILS # BLD AUTO: 0.03 K/UL
BASOPHILS NFR BLD AUTO: 0.6 %
BILIRUB SERPL-MCNC: 0.7 MG/DL
BUN SERPL-MCNC: 14 MG/DL
CALCIUM SERPL-MCNC: 9.2 MG/DL
CHLORIDE SERPL-SCNC: 106 MMOL/L
CHOLEST SERPL-MCNC: 218 MG/DL
CO2 SERPL-SCNC: 25 MMOL/L
CREAT SERPL-MCNC: 0.72 MG/DL
EGFR: 84 ML/MIN/1.73M2
EOSINOPHIL # BLD AUTO: 0.16 K/UL
EOSINOPHIL NFR BLD AUTO: 3.4 %
GLUCOSE SERPL-MCNC: 96 MG/DL
HCT VFR BLD CALC: 42.8 %
HDLC SERPL-MCNC: 52 MG/DL
HGB BLD-MCNC: 14 G/DL
IMM GRANULOCYTES NFR BLD AUTO: 0.4 %
LDLC SERPL CALC-MCNC: 139 MG/DL
LYMPHOCYTES # BLD AUTO: 1.58 K/UL
LYMPHOCYTES NFR BLD AUTO: 33.3 %
MAN DIFF?: NORMAL
MCHC RBC-ENTMCNC: 31.7 PG
MCHC RBC-ENTMCNC: 32.7 GM/DL
MCV RBC AUTO: 96.8 FL
MONOCYTES # BLD AUTO: 0.54 K/UL
MONOCYTES NFR BLD AUTO: 11.4 %
NEUTROPHILS # BLD AUTO: 2.42 K/UL
NEUTROPHILS NFR BLD AUTO: 50.9 %
NONHDLC SERPL-MCNC: 165 MG/DL
PLATELET # BLD AUTO: 304 K/UL
POTASSIUM SERPL-SCNC: 4.3 MMOL/L
PROT SERPL-MCNC: 6.8 G/DL
RBC # BLD: 4.42 M/UL
RBC # FLD: 13.7 %
SODIUM SERPL-SCNC: 142 MMOL/L
TRIGL SERPL-MCNC: 133 MG/DL
WBC # FLD AUTO: 4.75 K/UL

## 2022-11-16 ENCOUNTER — APPOINTMENT (OUTPATIENT)
Dept: FAMILY MEDICINE | Facility: CLINIC | Age: 82
End: 2022-11-16

## 2022-11-16 VITALS
SYSTOLIC BLOOD PRESSURE: 140 MMHG | BODY MASS INDEX: 31.01 KG/M2 | WEIGHT: 175 LBS | HEART RATE: 72 BPM | DIASTOLIC BLOOD PRESSURE: 82 MMHG | TEMPERATURE: 98.2 F | OXYGEN SATURATION: 100 % | HEIGHT: 63 IN

## 2022-11-16 VITALS — SYSTOLIC BLOOD PRESSURE: 132 MMHG | DIASTOLIC BLOOD PRESSURE: 72 MMHG

## 2022-11-16 DIAGNOSIS — Z00.00 ENCOUNTER FOR GENERAL ADULT MEDICAL EXAMINATION W/OUT ABNORMAL FINDINGS: ICD-10-CM

## 2022-11-16 PROCEDURE — G0439: CPT

## 2022-11-16 RX ORDER — METOPROLOL SUCCINATE 25 MG/1
25 TABLET, EXTENDED RELEASE ORAL
Qty: 90 | Refills: 0 | Status: ACTIVE | COMMUNITY
Start: 2022-09-09

## 2022-11-16 RX ORDER — CEFUROXIME AXETIL 500 MG/1
500 TABLET ORAL
Qty: 14 | Refills: 0 | Status: DISCONTINUED | COMMUNITY
Start: 2022-06-27

## 2022-11-16 RX ORDER — FAMOTIDINE 40 MG/1
40 TABLET, FILM COATED ORAL
Qty: 90 | Refills: 2 | Status: DISCONTINUED | COMMUNITY
Start: 2021-10-26 | End: 2022-11-16

## 2022-11-16 RX ORDER — PREDNISOLONE ACETATE 10 MG/ML
1 SUSPENSION/ DROPS OPHTHALMIC
Qty: 5 | Refills: 0 | Status: DISCONTINUED | COMMUNITY
Start: 2022-07-22

## 2022-11-16 NOTE — ASSESSMENT
[FreeTextEntry1] : TONI CORNELIUS is a 82 year old female here for a physical exam.  She is also here to follow up on medical issues as noted above.\par \par Patient has a history of hypertension, high cholesterol, osteoarthritis, GERD/chronic cough, and perennial allergic rhinitis.

## 2022-11-16 NOTE — HEALTH RISK ASSESSMENT
[PHQ-2 Negative - No further assessment needed] : PHQ-2 Negative - No further assessment needed [0] : 2) Feeling down, depressed, or hopeless: Not at all (0) [Any fall with injury in past year] : Patient reported fall with injury in the past year [[id number]] : 0

## 2022-11-16 NOTE — PHYSICAL EXAM
[No Acute Distress] : no acute distress [Well Developed] : well developed [Well-Appearing] : well-appearing [Normal Sclera/Conjunctiva] : normal sclera/conjunctiva [EOMI] : extraocular movements intact [No JVD] : no jugular venous distention [Normal Outer Ear/Nose] : the outer ears and nose were normal in appearance [No Lymphadenopathy] : no lymphadenopathy [Supple] : supple [Thyroid Normal, No Nodules] : the thyroid was normal and there were no nodules present [No Respiratory Distress] : no respiratory distress  [No Accessory Muscle Use] : no accessory muscle use [Clear to Auscultation] : lungs were clear to auscultation bilaterally [Normal Rate] : normal rate  [Regular Rhythm] : with a regular rhythm [Normal S1, S2] : normal S1 and S2 [No Murmur] : no murmur heard [No Carotid Bruits] : no carotid bruits [No Varicosities] : no varicosities [Pedal Pulses Present] : the pedal pulses are present [No Edema] : there was no peripheral edema [No Extremity Clubbing/Cyanosis] : no extremity clubbing/cyanosis [Non Tender] : non-tender [Soft] : abdomen soft [Non-distended] : non-distended [No Masses] : no abdominal mass palpated [No HSM] : no HSM [Normal Bowel Sounds] : normal bowel sounds [Normal Posterior Cervical Nodes] : no posterior cervical lymphadenopathy [Normal Anterior Cervical Nodes] : no anterior cervical lymphadenopathy [No CVA Tenderness] : no CVA  tenderness [No Spinal Tenderness] : no spinal tenderness [No Joint Swelling] : no joint swelling [No Rash] : no rash [Grossly Normal Strength/Tone] : grossly normal strength/tone [No Skin Lesions] : no skin lesions [Coordination Grossly Intact] : coordination grossly intact [No Focal Deficits] : no focal deficits [Normal Gait] : normal gait [Normal Affect] : the affect was normal [Normal Insight/Judgement] : insight and judgment were intact [de-identified] : mildly obese but wgt stable, occas cough noted throughout visit, no resp distress [de-identified] : lungs CTA B with regular breathing and with forceful cough [de-identified] : +pain and decreased ROM R shoulder with IR and +TTP over proximal biceps insertion point R shoulder.  [de-identified] : +post inflammatory hyperpigmentation R post calf (revd this is likely to be chronic but may continue to improve for up to a year after her cellulitis episode

## 2022-11-16 NOTE — REVIEW OF SYSTEMS
[Postnasal Drip] : postnasal drip [Cough] : cough [Incontinence] : incontinence [Joint Pain] : joint pain [Joint Stiffness] : joint stiffness [Muscle Pain] : muscle pain [Back Pain] : back pain [Negative] : Heme/Lymph [Discharge] : no discharge [Redness] : no redness [Vision Problems] : no vision problems [Earache] : no earache [Nasal Discharge] : no nasal discharge [Sore Throat] : no sore throat [Chest Pain] : no chest pain [Palpitations] : no palpitations [Lower Ext Edema] : no lower extremity edema [Shortness Of Breath] : no shortness of breath [Dyspnea on Exertion] : no dyspnea on exertion [Abdominal Pain] : no abdominal pain [Nausea] : no nausea [Constipation] : no constipation [Diarrhea] : diarrhea [Vomiting] : no vomiting [Heartburn] : heartburn [Dysuria] : no dysuria [Melena] : no melena [Hematuria] : no hematuria [Anxiety] : no anxiety [Depression] : no depression [FreeTextEntry3] : +macular degeneration. 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

## 2022-11-16 NOTE — HISTORY OF PRESENT ILLNESS
[de-identified] : Her last physical exam was last year\par \par Vaccines:\par Tetanus is up to date; last 11/12/2020\par Pneumococcal vaccination is NOT up to date-- thinks had Prevnar in the past but not sure when and does not think had Pneumovax\par Shingrix is NOT up to date\par COVID vaccine is up to date\par Has had flu vacc this season \par \par Her last dentist visit was less than one year ago\par Her last eye doctor appointment was less than one year ago\par Her last dermatologist visit was less than one year ago\par \par GYN visit is NOT up to date; last was in her 50s, she defers on further gyn exams at this point in her life\par Mammogram is no longer indicated for this age; last in 12/17/2021 stable/benign ([facility])\par Colon cancer screening is no longer indicated for this age; [place] Dr. Arenas is now her GI specialist \par DEXA is NOT up to date; last >10yrs ago, declines repeat DEXA at this time as she states she would not take Rx meds no matter what and she had no injuries in a signif fall 6/2022 so feels her bone density is reasonably good\par \par Her diet is healthy overall\par Exercise: keeps active around the house but not much formal exercise as she is a caregiver for her  (we disc and she will try incr exercise in her home as much as poss). \par \par Ms. Cui has HTN, high cholesterol, osteopenia, IFG, GERD/chronic cough, OA/chronic pain. She was also noted in 2022 to have a mild thoracic aortic aneurysm\par \par The daily Omeprazole initially kept her chronic cough very quiet but lately has been coughing a bit more. Coughs after eating no matter what she eats. Drinking water while eating helps decrease the coughing a bit. Also has incr cough when speaking on telephone. Has had prior GI and ENT and pulm eval of her cough and no clear cause found except for poss laryngeal reflux/GERD and post nasal drip\par '\par In June 2022 Had a fall on son's deck ( tripped and fell on top of her) and she fortunately did not break any bones but injured R leg and developed cellulitis. Was hospitalized and required IV Vanco and then d/c with oral Antibiotics to complete course. R leg stayed persistently red and . Still with lumpiness and discoloration but is improved. Walking is fine \par \par Sees Dr. Hamzah Shipman Q6 mos and is UTD on visits/testing. Next visit 1/2023. He started her on Metoprolol 25 mg low dose given that she was noted to have mild thoracic aortic aneurysm during imaging during 6/2022 hospitalization. \par

## 2022-11-16 NOTE — PLAN
[FreeTextEntry1] : CC labs to Dr. Hamzah Shipman \par \par Labs done prior to visit reviewed with Ms. Cui today. All results are wnl except for LDL of 139, stable from last year. TG and HDL have improved from last year and are now wnl. If she wants to consider trial of statin to try to get her LDL under 100 we can add that vs. if she still prefers to defer on statin med she should continue to try to eat as cleanly as possible and keep as physically active as possible. \par \par Continue all medications as prescribed. She does not wish to add statin at this time and can d/w Dr. Shipman when she sees him in 1/2023 to get his input about her lab results/his recommendations as well \par \par Reviewed age-appropriate preventive screening tests with patient. UTD on colonoscopy and no further screening needed. Due for DEXA but she declines rpt test at this point in her life as she states she would not take Rx med for bones no matter what T scores were; she will let me know if she changes her mind. Due for gyn exam as has not had exam since her 50s but she defers on gyn exams at this point in her life (but she is seeing urol for eval and treatment of possible pelvic organ prolapse). Revd pros/cons of mammogram screening at this point in her life and that they are not routinely indicated at this point in her life. She would like to continue periodic mammo screening (Q2 yrs is what she prefers) and will sched for near future. \par \par Reviewed/recommended Shingrix, PCV 20. \par \par BP goal is <=135/85 on average on home checks. Advised to let us know if BPs are above goal on home checks. \par \par LDL goal <=100 ideally. Reviewed LDL goal and ASCVD risk estimate and factors that go into this calculation.  Reviewed risks/benefits of statin med. Reviewed red yeast rice RYR (600-1200 mg daily) risks/benefits as an alternative to statin meds if not ready to consider statin meds. Recommended excellent hydration +/- co Q10 (100-400 mg daily) to decrease risk for statin (or RYR) related myalgias. \par \par She defers on starting statin for now but will d/w Dr. Shipman to get his input/recommendations as well. \par \par Discussed clean eating (eg Mediterranean style eating plan) and regular exercise/staying as physically active as possible.  Include balance exercises and strength training and core strengthening exercises for bone health and to decrease risk for falls. \par \par Reviewed lifestyle measures to help with GERD/heartburn including smaller more frequent meals, avoid large meals, avoid eating too close to bedtime, avoid lying down after eating, avoid tight clothing around midsection, consider elevating head of bed, try to id and avoid any food triggers, try stress reduction measures etc. \par \par Reviewed r/b/se PPI med and H2 blocker meds and that goal is to try to get to the lowest level of med use that will keep symptoms well controlled to minimize SE (eg H2 blocker med daily is preferable to PPI med daily, H2 blocker med prn is preferable to H2 blocker med daily etc).\par \par She will try adding PM famotidine 10-20 mg to see if helps ease the cough post meals. She will also try to increase water intake with meals and chew more slowly and thoroughly/smaller bites and try to limit talking while eating. \par \par Recommend RTO or see GI if incr/new sxs or if above measures not helping or if requiring more than occasional med use or if any alarm sxs (eg blood in stools or emesis, unexplained wgt loss, dysphagia, odynophagia, severe abdominal pain etc.). \par \par Reviewed importance of good self care (e.g. meditation, yoga, adequate rest, regular exercise, magnesium, clean eating, etc.).\par \par Follow up for next physical in one year. RPA (chol, HTN, IFG etc) visit and repeat fasting labs due in 6 months or if seeing card at that time she can defer on visit here if she prefers.

## 2023-02-07 ENCOUNTER — FORM ENCOUNTER (OUTPATIENT)
Age: 83
End: 2023-02-07

## 2023-02-13 ENCOUNTER — APPOINTMENT (OUTPATIENT)
Dept: FAMILY MEDICINE | Facility: CLINIC | Age: 83
End: 2023-02-13
Payer: MEDICARE

## 2023-02-13 ENCOUNTER — LABORATORY RESULT (OUTPATIENT)
Age: 83
End: 2023-02-13

## 2023-02-13 ENCOUNTER — RESULT CHARGE (OUTPATIENT)
Age: 83
End: 2023-02-13

## 2023-02-13 VITALS
TEMPERATURE: 97.3 F | HEART RATE: 89 BPM | WEIGHT: 172 LBS | OXYGEN SATURATION: 95 % | SYSTOLIC BLOOD PRESSURE: 142 MMHG | DIASTOLIC BLOOD PRESSURE: 78 MMHG | BODY MASS INDEX: 30.48 KG/M2 | HEIGHT: 63 IN

## 2023-02-13 DIAGNOSIS — R39.15 URGENCY OF URINATION: ICD-10-CM

## 2023-02-13 DIAGNOSIS — N81.10 CYSTOCELE, UNSPECIFIED: ICD-10-CM

## 2023-02-13 DIAGNOSIS — R32 UNSPECIFIED URINARY INCONTINENCE: ICD-10-CM

## 2023-02-13 LAB
BILIRUB UR QL STRIP: NORMAL
CLARITY UR: CLEAR
COLLECTION METHOD: NORMAL
GLUCOSE UR-MCNC: NORMAL
HCG UR QL: 0.2 EU/DL
HGB UR QL STRIP.AUTO: NORMAL
KETONES UR-MCNC: NORMAL
LEUKOCYTE ESTERASE UR QL STRIP: NORMAL
NITRITE UR QL STRIP: NORMAL
PH UR STRIP: 5.5
PROT UR STRIP-MCNC: NORMAL
SP GR UR STRIP: 1.02

## 2023-02-13 PROCEDURE — 81003 URINALYSIS AUTO W/O SCOPE: CPT | Mod: QW

## 2023-02-13 PROCEDURE — 99213 OFFICE O/P EST LOW 20 MIN: CPT | Mod: 25

## 2023-02-13 NOTE — ASSESSMENT
[FreeTextEntry1] : \par \par   Test   Result   Flag Reference Goal Last Verified \par   Glucose NEG      REQUIRED \par   Bilirubin NEG      REQUIRED \par   Ketone NEG      REQUIRED \par   Specific Gravity 1.025      REQUIRED \par   Blood NEG      REQUIRED \par   pH 5.5      REQUIRED \par   Protein TRACE      REQUIRED \par   Urobilinogen 0.2 EU/dl      REQUIRED \par   Nitrite POS      REQUIRED \par   Leukocytes LARGE      REQUIRED \par   Clarity Clear      REQUIRED \par   Collection Method Clean Catch \par

## 2023-02-13 NOTE — REVIEW OF SYSTEMS
[Dysuria] : dysuria [Incontinence] : incontinence [Frequency] : frequency [Negative] : Gastrointestinal [Hematuria] : no hematuria

## 2023-02-13 NOTE — HISTORY OF PRESENT ILLNESS
[FreeTextEntry8] : SV\par c/o urinary urgency and pain with urination that radiates through her whole body.  Symptoms started on Jan 16.  At which time she was evaluated at [facility] and diagnosed with UTI.  She was prescribed Bactrim x 5 days.  Symptoms did not improve so then she was prescribed Keflex for 7 days.  Symptoms still did not resolve.\par She has a history of urinary incontinence and wears pads. She is constantly cleaning has good hygeine.  Her last UTI was many years ago. She states she has bladder prolapse and is a candidate for surgery, but she hasn’t gotten around to it yet. \par \par \par

## 2023-02-17 DIAGNOSIS — N30.00 ACUTE CYSTITIS W/OUT HEMATURIA: ICD-10-CM

## 2023-02-17 LAB
APPEARANCE: ABNORMAL
BILIRUBIN URINE: NEGATIVE
BLOOD URINE: NEGATIVE
COLOR: ABNORMAL
GLUCOSE QUALITATIVE U: NEGATIVE
KETONES URINE: NEGATIVE
LEUKOCYTE ESTERASE URINE: ABNORMAL
NITRITE URINE: POSITIVE
PH URINE: 5.5
PROTEIN URINE: NORMAL
SPECIFIC GRAVITY URINE: 1.02
UROBILINOGEN URINE: NORMAL

## 2023-02-28 ENCOUNTER — NON-APPOINTMENT (OUTPATIENT)
Age: 83
End: 2023-02-28

## 2023-03-30 NOTE — PATIENT PROFILE ADULT - FUNCTIONAL SCREEN CURRENT LEVEL: COMMUNICATION, MLM
Libtayo Counseling- I discussed with the patient the risks of Libtayo including but not limited to nausea, vomiting, diarrhea, and bone or muscle pain.  The patient verbalized understanding of the proper use and possible adverse effects of Libtayo.  All of the patient's questions and concerns were addressed. 0 = understands/communicates without difficulty

## 2023-04-05 NOTE — ED ADULT TRIAGE NOTE - NS ED NURSE BANDS TYPE
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 has HHA/caregiver currently. only family in area is his half brother, his wife passed away a few years ago. he reports he feels lousy and sometimes thinks about taking too much of his anti-depressants however has no current intent. 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 associated with insomnia as well. \par -last visit referral made to [facility] program for behavioral health (virtual counseling for older adults) however per documentation they have been unable to reach the patient. Discussed issue with  Key Good who agreed to reach out to the [facility] program and provide them with Lupe's (caregiver)'s phone number as the patient himself sometimes does not hear the phone. \par -c/w escitalopram 20mg qd, trazodone 50mg qhs and mirtazapine 30mg qhs, refilled today to correct pharmacy \par \par #viral URI \par -patient with rhinorrhea, sinus congestion and cough productive of clear sputum x5 days \par -denies chest pain, fevers/chills however has been having worsening CHRISTIAN from baseline\par -likely due to viral URI vs aspiration pneumonitis , less likely pneumonia or bacterial infection as patient afebrile and lung exam without rhonchi or decreased breath sounds \par -less concern for COPD exacerbation as no wheezes on exam and satting 94% on RA; per HHA no increase in home O2 requirements \par -c/w nebulizer treatments, tylenol prn, mucinex, robitussin for symptom management \par -advised to go to ED or call clinic if develops worsening SOB, wheezing, fevers, increasing O2 req as may need steroids or antibiotics ; will just monitor for now \par \par #hypothyroidism\par -TSH wnl 11/2022\par -c/w synthroid 50mcg , refilled today \par \par #COPD, on home 2-3L NC intermittently \par -likely not in exacerbation currently as no wheezes on exam and no increasing oxygen requirements \par -c/w symbicort inhaler BID, refilled today \par -c/w albuterol rescue inhaler \par -f/u with pulm Dr. Mack \par \par #CAD s/p stent\par #hx of CVA \par -c/w plavix 75 qd and atorvastatin 40mg qhs, refilled today \par -f/u with cardiology Dr. Wiley in 6 months \par \par #facial and scalp itchiness \par -patient with chronic pruritus of face and scalp for past few years, has been using lotion without relief \par -no skin lesions, rashes, or dry skin noted on exam today \par -has derm appt next friday, f/u with derm \par \par #hemorrhoids \par -c/w rectasmooth cream prn; refilled today \par \par RTC in 6 weeks for follow up on depression and multiple medical conditions
Name band;

## 2023-08-10 NOTE — PATIENT PROFILE ADULT - FUNCTIONAL ASSESSMENT - DAILY ACTIVITY 3.
[FreeTextEntry1] : Eligibility confirmed, LDCT scheduled for 8/11/23 4 = No assist / stand by assistance

## 2023-08-28 ENCOUNTER — APPOINTMENT (OUTPATIENT)
Dept: FAMILY MEDICINE | Facility: CLINIC | Age: 83
End: 2023-08-28

## 2023-09-18 ENCOUNTER — APPOINTMENT (OUTPATIENT)
Dept: FAMILY MEDICINE | Facility: CLINIC | Age: 83
End: 2023-09-18
Payer: MEDICARE

## 2023-09-18 VITALS
OXYGEN SATURATION: 96 % | TEMPERATURE: 98.4 F | WEIGHT: 172 LBS | SYSTOLIC BLOOD PRESSURE: 128 MMHG | BODY MASS INDEX: 30.48 KG/M2 | HEIGHT: 63 IN | HEART RATE: 87 BPM | RESPIRATION RATE: 18 BRPM | DIASTOLIC BLOOD PRESSURE: 80 MMHG

## 2023-09-18 DIAGNOSIS — M25.50 PAIN IN UNSPECIFIED JOINT: ICD-10-CM
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
## 2023-09-18 DIAGNOSIS — R05.9 COUGH, UNSPECIFIED: ICD-10-CM

## 2023-09-18 DIAGNOSIS — R53.83 OTHER FATIGUE: ICD-10-CM

## 2023-09-18 PROCEDURE — 99214 OFFICE O/P EST MOD 30 MIN: CPT | Mod: 25

## 2023-09-18 PROCEDURE — 36415 COLL VENOUS BLD VENIPUNCTURE: CPT

## 2023-09-18 RX ORDER — NITROFURANTOIN (MONOHYDRATE/MACROCRYSTALS) 25; 75 MG/1; MG/1
100 CAPSULE ORAL TWICE DAILY
Qty: 10 | Refills: 0 | Status: DISCONTINUED | COMMUNITY
Start: 2023-02-17 | End: 2023-09-18

## 2023-09-19 LAB
ALBUMIN SERPL ELPH-MCNC: 4.8 G/DL
ALP BLD-CCNC: 91 U/L
ALT SERPL-CCNC: 15 U/L
ANION GAP SERPL CALC-SCNC: 12 MMOL/L
AST SERPL-CCNC: 18 U/L
BASOPHILS # BLD AUTO: 0.03 K/UL
BASOPHILS NFR BLD AUTO: 0.5 %
BILIRUB SERPL-MCNC: 0.6 MG/DL
BUN SERPL-MCNC: 17 MG/DL
CALCIUM SERPL-MCNC: 10.2 MG/DL
CHLORIDE SERPL-SCNC: 101 MMOL/L
CO2 SERPL-SCNC: 25 MMOL/L
CREAT SERPL-MCNC: 0.71 MG/DL
CRP SERPL-MCNC: <3 MG/L
EGFR: 85 ML/MIN/1.73M2
EOSINOPHIL # BLD AUTO: 0.12 K/UL
EOSINOPHIL NFR BLD AUTO: 1.9 %
ERYTHROCYTE [SEDIMENTATION RATE] IN BLOOD BY WESTERGREN METHOD: 21 MM/HR
GLUCOSE SERPL-MCNC: 101 MG/DL
HCT VFR BLD CALC: 43.4 %
HGB BLD-MCNC: 14.4 G/DL
IMM GRANULOCYTES NFR BLD AUTO: 0.2 %
LYMPHOCYTES # BLD AUTO: 1.38 K/UL
LYMPHOCYTES NFR BLD AUTO: 21.7 %
MAN DIFF?: NORMAL
MCHC RBC-ENTMCNC: 32.5 PG
MCHC RBC-ENTMCNC: 33.2 GM/DL
MCV RBC AUTO: 98 FL
MONOCYTES # BLD AUTO: 0.68 K/UL
MONOCYTES NFR BLD AUTO: 10.7 %
NEUTROPHILS # BLD AUTO: 4.14 K/UL
NEUTROPHILS NFR BLD AUTO: 65 %
PLATELET # BLD AUTO: 318 K/UL
POTASSIUM SERPL-SCNC: 4.5 MMOL/L
PROT SERPL-MCNC: 7.3 G/DL
RBC # BLD: 4.43 M/UL
RBC # FLD: 12.9 %
RHEUMATOID FACT SER QL: 20 IU/ML
SODIUM SERPL-SCNC: 138 MMOL/L
WBC # FLD AUTO: 6.36 K/UL

## 2023-09-20 ENCOUNTER — OUTPATIENT (OUTPATIENT)
Dept: OUTPATIENT SERVICES | Facility: HOSPITAL | Age: 83
LOS: 1 days | End: 2023-09-20
Payer: MEDICARE

## 2023-09-20 ENCOUNTER — APPOINTMENT (OUTPATIENT)
Dept: CT IMAGING | Facility: CLINIC | Age: 83
End: 2023-09-20
Payer: MEDICARE

## 2023-09-20 DIAGNOSIS — R05.9 COUGH, UNSPECIFIED: ICD-10-CM

## 2023-09-20 PROCEDURE — 71250 CT THORAX DX C-: CPT | Mod: 26,MH

## 2023-09-20 PROCEDURE — 71250 CT THORAX DX C-: CPT

## 2023-09-21 LAB
A PHAGOCYTOPH IGG TITR SER IF: NORMAL TITER
ANA SER IF-ACNC: NEGATIVE
B BURGDOR AB SER QL IA: NEGATIVE
B MICROTI IGG TITR SER: NORMAL TITER
E CHAFFEENSIS IGG TITR SER IF: NORMAL TITER

## 2023-09-28 ENCOUNTER — RX RENEWAL (OUTPATIENT)
Age: 83
End: 2023-09-28

## 2023-09-28 ENCOUNTER — APPOINTMENT (OUTPATIENT)
Dept: INTERNAL MEDICINE | Facility: CLINIC | Age: 83
End: 2023-09-28
Payer: MEDICARE

## 2023-09-28 VITALS
BODY MASS INDEX: 31.65 KG/M2 | SYSTOLIC BLOOD PRESSURE: 134 MMHG | OXYGEN SATURATION: 98 % | TEMPERATURE: 98.1 F | RESPIRATION RATE: 18 BRPM | DIASTOLIC BLOOD PRESSURE: 86 MMHG | HEART RATE: 97 BPM | WEIGHT: 172 LBS | HEIGHT: 62 IN

## 2023-09-28 PROCEDURE — 94727 GAS DIL/WSHOT DETER LNG VOL: CPT

## 2023-09-28 PROCEDURE — 94060 EVALUATION OF WHEEZING: CPT

## 2023-09-28 PROCEDURE — 99204 OFFICE O/P NEW MOD 45 MIN: CPT | Mod: 25

## 2023-09-28 PROCEDURE — 94729 DIFFUSING CAPACITY: CPT

## 2023-09-28 PROCEDURE — ZZZZZ: CPT

## 2023-09-28 RX ORDER — IPRATROPIUM BROMIDE 42 UG/1
0.06 SPRAY NASAL
Refills: 0 | Status: DISCONTINUED | COMMUNITY
End: 2023-09-28

## 2023-09-28 RX ORDER — BUDESONIDE AND FORMOTEROL FUMARATE DIHYDRATE 160; 4.5 UG/1; UG/1
160-4.5 AEROSOL RESPIRATORY (INHALATION)
Qty: 1 | Refills: 1 | Status: ACTIVE | COMMUNITY
Start: 2023-09-28 | End: 1900-01-01

## 2023-09-28 RX ORDER — TRAMADOL HYDROCHLORIDE 50 MG/1
50 TABLET, COATED ORAL
Qty: 30 | Refills: 0 | Status: DISCONTINUED | COMMUNITY
Start: 2022-06-17 | End: 2023-09-28

## 2023-10-23 ENCOUNTER — MED ADMIN CHARGE (OUTPATIENT)
Age: 83
End: 2023-10-23

## 2023-10-23 ENCOUNTER — APPOINTMENT (OUTPATIENT)
Dept: FAMILY MEDICINE | Facility: CLINIC | Age: 83
End: 2023-10-23
Payer: MEDICARE

## 2023-10-23 VITALS
HEART RATE: 73 BPM | SYSTOLIC BLOOD PRESSURE: 122 MMHG | BODY MASS INDEX: 31.65 KG/M2 | WEIGHT: 172 LBS | OXYGEN SATURATION: 96 % | TEMPERATURE: 97.7 F | HEIGHT: 62 IN | DIASTOLIC BLOOD PRESSURE: 80 MMHG

## 2023-10-23 DIAGNOSIS — I10 ESSENTIAL (PRIMARY) HYPERTENSION: ICD-10-CM

## 2023-10-23 DIAGNOSIS — H35.30 UNSPECIFIED MACULAR DEGENERATION: ICD-10-CM

## 2023-10-23 DIAGNOSIS — I71.20 THORACIC AORTIC ANEURYSM, WITHOUT RUPTURE, UNSPECIFIED: ICD-10-CM

## 2023-10-23 DIAGNOSIS — E78.00 PURE HYPERCHOLESTEROLEMIA, UNSPECIFIED: ICD-10-CM

## 2023-10-23 DIAGNOSIS — M15.9 POLYOSTEOARTHRITIS, UNSPECIFIED: ICD-10-CM

## 2023-10-23 DIAGNOSIS — M85.80 OTHER SPECIFIED DISORDERS OF BONE DENSITY AND STRUCTURE, UNSPECIFIED SITE: ICD-10-CM

## 2023-10-23 DIAGNOSIS — Z23 ENCOUNTER FOR IMMUNIZATION: ICD-10-CM

## 2023-10-23 PROCEDURE — 36415 COLL VENOUS BLD VENIPUNCTURE: CPT

## 2023-10-23 PROCEDURE — 99215 OFFICE O/P EST HI 40 MIN: CPT | Mod: 25

## 2023-10-24 NOTE — DISCHARGE NOTE PROVIDER - PROVIDER RX CONTACT NUMBER
[phone] Ear Star Wedge Flap Text: The defect edges were debeveled with a #15 blade scalpel.  Given the location of the defect and the proximity to free margins (helical rim) an ear star wedge flap was deemed most appropriate. Using a sterile surgical marker, the appropriate flap was drawn incorporating the defect and placing the expected incisions between the helical rim and antihelix where possible.  The area thus outlined was incised through and through with a #15 scalpel blade. Following this, the designed flap was carried over into the primary defect and sutured into place.

## 2023-10-26 LAB
ALBUMIN SERPL ELPH-MCNC: 4.5 G/DL
ALP BLD-CCNC: 82 U/L
ALT SERPL-CCNC: 14 U/L
ANION GAP SERPL CALC-SCNC: 13 MMOL/L
AST SERPL-CCNC: 13 U/L
BILIRUB SERPL-MCNC: 0.5 MG/DL
BUN SERPL-MCNC: 15 MG/DL
CALCIUM SERPL-MCNC: 9.4 MG/DL
CHLORIDE SERPL-SCNC: 103 MMOL/L
CHOLEST SERPL-MCNC: 259 MG/DL
CO2 SERPL-SCNC: 21 MMOL/L
CREAT SERPL-MCNC: 0.69 MG/DL
CRP SERPL-MCNC: <3 MG/L
EGFR: 87 ML/MIN/1.73M2
ERYTHROCYTE [SEDIMENTATION RATE] IN BLOOD BY WESTERGREN METHOD: 14 MM/HR
GLUCOSE SERPL-MCNC: 91 MG/DL
HDLC SERPL-MCNC: 52 MG/DL
LDLC SERPL CALC-MCNC: 161 MG/DL
NONHDLC SERPL-MCNC: 207 MG/DL
POTASSIUM SERPL-SCNC: 4.2 MMOL/L
PROT SERPL-MCNC: 7 G/DL
RHEUMATOID FACT SER QL: 35 IU/ML
SODIUM SERPL-SCNC: 137 MMOL/L
TRIGL SERPL-MCNC: 249 MG/DL

## 2023-11-01 ENCOUNTER — RX RENEWAL (OUTPATIENT)
Age: 83
End: 2023-11-01

## 2023-12-17 ENCOUNTER — RX RENEWAL (OUTPATIENT)
Age: 83
End: 2023-12-17

## 2024-01-08 ENCOUNTER — APPOINTMENT (OUTPATIENT)
Dept: MRI IMAGING | Facility: CLINIC | Age: 84
End: 2024-01-08
Payer: MEDICARE

## 2024-01-08 ENCOUNTER — OUTPATIENT (OUTPATIENT)
Dept: OUTPATIENT SERVICES | Facility: HOSPITAL | Age: 84
LOS: 1 days | End: 2024-01-08
Payer: MEDICARE

## 2024-01-08 DIAGNOSIS — M54.6 PAIN IN THORACIC SPINE: ICD-10-CM

## 2024-01-08 PROCEDURE — 72146 MRI CHEST SPINE W/O DYE: CPT

## 2024-01-08 PROCEDURE — 72146 MRI CHEST SPINE W/O DYE: CPT | Mod: 26,MH

## 2024-01-18 ENCOUNTER — APPOINTMENT (OUTPATIENT)
Dept: NEUROSURGERY | Facility: CLINIC | Age: 84
End: 2024-01-18
Payer: MEDICARE

## 2024-01-18 VITALS
BODY MASS INDEX: 30.83 KG/M2 | HEART RATE: 81 BPM | OXYGEN SATURATION: 98 % | SYSTOLIC BLOOD PRESSURE: 143 MMHG | WEIGHT: 174 LBS | DIASTOLIC BLOOD PRESSURE: 80 MMHG | HEIGHT: 63 IN

## 2024-01-18 PROCEDURE — 99204 OFFICE O/P NEW MOD 45 MIN: CPT

## 2024-01-24 NOTE — CONSULT LETTER
[Dear  ___] : Dear  [unfilled], [Courtesy Letter:] : I had the pleasure of seeing your patient, [unfilled], in my office today. [Sincerely,] : Sincerely, [FreeTextEntry2] : Lauren Zimmer  [address]  [address] [place] [id number] [FreeTextEntry1] : Mrs. Cui is a very pleasant 83-year-old female patient who was seen in our office today regarding right-sided low back pain.    The patient endorses an approximate 3-year history of intermittent right-sided low back pain which has now become chronic.  The patient does not recall any specific inciting event but states that she has had to look after her ailing  more so in the last several months when her pain has worsened.  The patient's pain is localized to the right of midline and does not radiate into the lower extremities.  The patient's pain is described as a stabbing sensation which is only present when she is sitting or standing.  The patient's pain is essentially gone when recumbent.  The patient endorses worsening of her symptoms with specific movements of her lumbar spine.  The patient endorses allergies to ciprofloxacin and morphine.  The patient's current medical regimen includes metoprolol and omeprazole.  The patient's past medical history is significant for GERD and hypertension.  On examination, the patient is alert, oriented, and compliant with the exam.  The patient does not demonstrate any midline tenderness.  The patient demonstrates full strength in the upper and lower extremities bilaterally.  The patient demonstrates poor range of motion of the lumbar spine and has significant difficulty with a lateral bend towards the right which causes worsening pain.  Flexion, extension, and lateral bend to the left does not cause significant pain.  The patient also keeps her torso and neck quite rigid during the conversation and turns her entire body to follow me across the room as opposed to twisting in the thoracolumbar region or in the cervical spine.  The patient is, however, able to twist and rotate her cervical and lumbar spine when asked.  The patient is accompanied with an MRI scan of the thoracic spine performed on January 8, 2024.  These images demonstrate multilevel degenerative changes without evidence of a compression fracture, cord compression, or nerve root compression.  There is a sclerotic lesion at T11 most suggestive of an atypical hemangioma.  These images demonstrate more typical appearing hemangiomas at T10 and T9.  These lesions appear stable compared to the patient's previous CT scans from 2021.  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  Taken together, the patient's clinical history and radiographic findings are most supportive of right-sided muscular pain.  I reassured the patient that her imaging findings with hemangiomas and the possibility of an atypical hemangioma are stable and have not changed in years.  The patient additionally has severe lumbar spine findings at L5/S1 but without any correlated neurologic symptoms.  As such, I recommended against surgery in the low back or in the thoracic spine.  From a pain perspective, the patient's symptoms are most likely related to a muscular cause.  When asked specifically, the patient states that she also gets cramping throughout her abdomen and torso which she always believed was related to her chronic cough.  With this additional information, my best analysis of the patient's clinical situation is that she has chronic abdominal and torso muscle irritation secondary to her chronic cough and lack of motion causing her current issues.  The symptoms are additionally being exacerbated by her new duties at home.  At this time I have recommended physical therapy and massage therapy with specific emphasis on range of motion of the abdominal and thoracolumbar musculature.  The patient has been provided a prescription for meloxicam which she has used successfully in the past.  The patient is already on omeprazole for GERD and has been advised to take these pain medications with food.  The patient has been offered pain management options in the form of trigger point injections but has deferred this option for now.  The patient will be following up with us in approximately 6 weeks to reevaluate her progress. [FreeTextEntry3] : Alo Monk MD, PhD, CS, FAANS Attending Neurosurgeon  of Neurosurgery [facility] at [facility] at [address], [place] [address] Office: [phone] Fax: [phone]

## 2024-02-09 ENCOUNTER — RX RENEWAL (OUTPATIENT)
Age: 84
End: 2024-02-09

## 2024-02-09 RX ORDER — FLUTICASONE PROPIONATE 50 UG/1
50 SPRAY, METERED NASAL
Qty: 48 | Refills: 0 | Status: ACTIVE | COMMUNITY
Start: 2023-09-28 | End: 1900-01-01

## 2024-02-23 ENCOUNTER — RX RENEWAL (OUTPATIENT)
Age: 84
End: 2024-02-23

## 2024-02-23 RX ORDER — MOMETASONE FUROATE AND FORMOTEROL FUMARATE DIHYDRATE 200; 5 UG/1; UG/1
200-5 AEROSOL RESPIRATORY (INHALATION)
Qty: 1 | Refills: 1 | Status: ACTIVE | COMMUNITY
Start: 2023-10-03 | End: 1900-01-01

## 2024-02-29 ENCOUNTER — APPOINTMENT (OUTPATIENT)
Dept: NEUROSURGERY | Facility: CLINIC | Age: 84
End: 2024-02-29
Payer: MEDICARE

## 2024-02-29 DIAGNOSIS — G89.29 LOW BACK PAIN, UNSPECIFIED: ICD-10-CM

## 2024-02-29 DIAGNOSIS — M43.17 SPONDYLOLISTHESIS, LUMBOSACRAL REGION: ICD-10-CM

## 2024-02-29 DIAGNOSIS — M54.50 LOW BACK PAIN, UNSPECIFIED: ICD-10-CM

## 2024-02-29 PROCEDURE — 99213 OFFICE O/P EST LOW 20 MIN: CPT

## 2024-03-01 PROBLEM — M43.17 SPONDYLOLISTHESIS OF LUMBOSACRAL REGION: Status: ACTIVE | Noted: 2024-03-01

## 2024-03-01 PROBLEM — M54.50 CHRONIC RIGHT-SIDED LOW BACK PAIN WITHOUT SCIATICA: Status: ACTIVE | Noted: 2024-01-18

## 2024-03-01 NOTE — CONSULT LETTER
[FreeTextEntry1] : Mrs. Cui is a very pleasant 83-year-old female patient who was seen in our office today in follow-up.  The patient was previously seen regarding MRI scan findings and a right-sided thoracolumbar pain.  I am happy to report that the patient is improving albeit slowly with physical therapy alone.  The patient has been working with physical therapy on core strengthening and postural exercises.  The patient did not attempt meloxicam as she was concerned about the side effects.  The patient has been taking Tylenol as needed.  The patient's pain is intermittent and usually associated with flexion of the spine.  Once the pain occurs, it lingers for several minutes before resolving.  On examination, the patient remains alert, oriented, and compliant with the exam.  The patient demonstrates full strength in the upper and lower extremities bilaterally.  There is mild tenderness over the right thoracolumbar region.  I have no new imaging to review today.  The patient's previous images demonstrated a grade 2 spondylolisthesis at L5/S1 secondary to bilateral pars defects with severe bilateral foraminal stenosis.  Taken together, I am gratified to see the patient doing well following conservative therapy alone.  The patient's physical findings, clinical symptoms, and radiographic findings are most consistent with muscle dysfunction which is best treated with physical therapy.  The patient is interested in trigger point injections around this region and I have provided a pain management referral for this option.  The patient has elected to follow-up with us on an as-needed basis.  Given her good clinical trajectory, I believe this is very reasonable.  The patient is aware that our office is open to her anytime should the need arise.

## 2024-03-04 ENCOUNTER — APPOINTMENT (OUTPATIENT)
Dept: INTERNAL MEDICINE | Facility: CLINIC | Age: 84
End: 2024-03-04
Payer: MEDICARE

## 2024-03-04 ENCOUNTER — NON-APPOINTMENT (OUTPATIENT)
Age: 84
End: 2024-03-04

## 2024-03-04 VITALS
HEIGHT: 63 IN | HEART RATE: 77 BPM | BODY MASS INDEX: 30.83 KG/M2 | WEIGHT: 174 LBS | OXYGEN SATURATION: 98 % | TEMPERATURE: 98 F | DIASTOLIC BLOOD PRESSURE: 70 MMHG | RESPIRATION RATE: 18 BRPM | SYSTOLIC BLOOD PRESSURE: 110 MMHG

## 2024-03-04 DIAGNOSIS — R09.82 POSTNASAL DRIP: ICD-10-CM

## 2024-03-04 DIAGNOSIS — K21.9 GASTRO-ESOPHAGEAL REFLUX DISEASE W/OUT ESOPHAGITIS: ICD-10-CM

## 2024-03-04 DIAGNOSIS — R05.3 CHRONIC COUGH: ICD-10-CM

## 2024-03-04 DIAGNOSIS — J44.9 CHRONIC OBSTRUCTIVE PULMONARY DISEASE, UNSPECIFIED: ICD-10-CM

## 2024-03-04 DIAGNOSIS — Z77.22 CONTACT WITH AND (SUSPECTED) EXPOSURE TO ENVIRONMENTAL TOBACCO SMOKE (ACUTE) (CHRONIC): ICD-10-CM

## 2024-03-04 PROCEDURE — 99214 OFFICE O/P EST MOD 30 MIN: CPT | Mod: 25

## 2024-03-04 PROCEDURE — 94010 BREATHING CAPACITY TEST: CPT

## 2024-03-04 PROCEDURE — G0447 BEHAVIOR COUNSEL OBESITY 15M: CPT | Mod: 59

## 2024-03-04 RX ORDER — ASCORBIC ACID 125 MG
TABLET,CHEWABLE ORAL
Refills: 0 | Status: DISCONTINUED | COMMUNITY
End: 2024-03-04

## 2024-03-04 RX ORDER — MELOXICAM 7.5 MG/1
7.5 TABLET ORAL TWICE DAILY
Qty: 30 | Refills: 1 | Status: DISCONTINUED | COMMUNITY
Start: 2024-01-18 | End: 2024-03-04

## 2024-03-04 NOTE — PROCEDURE
[FreeTextEntry1] : Spirometry today shows a mild restrictive and obstructive deficit with an FEV1 of 1.47.

## 2024-03-04 NOTE — HISTORY OF PRESENT ILLNESS
[TextBox_4] : This is a return visit for this 83-year-old female with a history of secondhand smoke exposure, mild COPD, chronic cough times years.  She occasionally notes some clear sputum and will only occasionally note a wheeze.  She is not having hemoptysis.  She does not use home oxygen or CPAP.  Her cough seemed to improve initially with Dulera 200 strength at 1 puff twice daily but then seem to recur again.  She will try increasing Dulera to 2 Puffs twice daily.  She has a history of GERD, hiatal hernia, and is maintained on omeprazole 20 mg p.o. daily.  She will try increasing to 40 mg/day for 1 month and then back to 20 mg/day.  She also has postnasal drip and is taking Flonase.  She will add nasal saline spray as needed for any postnasal drip.

## 2024-03-04 NOTE — PHYSICAL EXAM
[No Acute Distress] : no acute distress [Normal Appearance] : normal appearance [Normal Oropharynx] : normal oropharynx [No Neck Mass] : no neck mass [Normal Rate/Rhythm] : normal rate/rhythm [No Murmurs] : no murmurs [Normal S1, S2] : normal s1, s2 [No Resp Distress] : no resp distress [No Abnormalities] : no abnormalities [Clear to Auscultation Bilaterally] : clear to auscultation bilaterally [Benign] : benign [Normal Gait] : normal gait [No Cyanosis] : no cyanosis [No Clubbing] : no clubbing [No Edema] : no edema [Normal Color/ Pigmentation] : normal color/ pigmentation [Oriented x3] : oriented x3 [No Focal Deficits] : no focal deficits [TextBox_68] : Few bibasilar rhonchi. [Normal Affect] : normal affect

## 2024-03-04 NOTE — ASSESSMENT
[FreeTextEntry1] : #1  83-year-old female with history of secondhand smoke exposure and mild COPD and chronic cough.  She will try increasing her Dulera 200 strength to 2 puffs twice daily with gargle after.  For component of reflux she will increase her omeprazole to 40 mg/day for 1 month and then back to 20 mg/day.  Continue GERD regimen.  No bedtime snacks.  For possible component of postnasal drip she will continue Flonase 1 spray in each nostril twice daily.  Will add nasal saline spray as needed for postnasal drip.  #2 BMI 30.82.  See counseling and instructions on diet and exercise above.  For return pulmonary appointment in 6 months with full pulmonary function testing at that time.  Return on an as-needed basis.

## 2024-03-27 ENCOUNTER — APPOINTMENT (OUTPATIENT)
Dept: PHYSICAL MEDICINE AND REHAB | Facility: CLINIC | Age: 84
End: 2024-03-27

## 2024-08-19 ENCOUNTER — APPOINTMENT (OUTPATIENT)
Dept: FAMILY MEDICINE | Facility: CLINIC | Age: 84
End: 2024-08-19
Payer: MEDICARE

## 2024-08-19 VITALS
HEART RATE: 82 BPM | DIASTOLIC BLOOD PRESSURE: 64 MMHG | HEIGHT: 63 IN | OXYGEN SATURATION: 95 % | TEMPERATURE: 97 F | SYSTOLIC BLOOD PRESSURE: 118 MMHG | WEIGHT: 172 LBS | BODY MASS INDEX: 30.48 KG/M2

## 2024-08-19 DIAGNOSIS — E78.00 PURE HYPERCHOLESTEROLEMIA, UNSPECIFIED: ICD-10-CM

## 2024-08-19 DIAGNOSIS — J44.9 CHRONIC OBSTRUCTIVE PULMONARY DISEASE, UNSPECIFIED: ICD-10-CM

## 2024-08-19 DIAGNOSIS — M85.80 OTHER SPECIFIED DISORDERS OF BONE DENSITY AND STRUCTURE, UNSPECIFIED SITE: ICD-10-CM

## 2024-08-19 DIAGNOSIS — I10 ESSENTIAL (PRIMARY) HYPERTENSION: ICD-10-CM

## 2024-08-19 DIAGNOSIS — Z00.00 ENCOUNTER FOR GENERAL ADULT MEDICAL EXAMINATION W/OUT ABNORMAL FINDINGS: ICD-10-CM

## 2024-08-19 DIAGNOSIS — R05.3 CHRONIC COUGH: ICD-10-CM

## 2024-08-19 DIAGNOSIS — M25.50 PAIN IN UNSPECIFIED JOINT: ICD-10-CM

## 2024-08-19 DIAGNOSIS — K21.9 GASTRO-ESOPHAGEAL REFLUX DISEASE W/OUT ESOPHAGITIS: ICD-10-CM

## 2024-08-19 DIAGNOSIS — I71.20 THORACIC AORTIC ANEURYSM, WITHOUT RUPTURE, UNSPECIFIED: ICD-10-CM

## 2024-08-19 PROCEDURE — 36415 COLL VENOUS BLD VENIPUNCTURE: CPT

## 2024-08-19 PROCEDURE — G0439: CPT

## 2024-08-19 RX ORDER — METOPROLOL SUCCINATE 50 MG/1
50 TABLET, EXTENDED RELEASE ORAL
Qty: 90 | Refills: 0 | Status: ACTIVE | COMMUNITY
Start: 2024-07-22

## 2024-08-19 RX ORDER — ROSUVASTATIN CALCIUM 5 MG/1
5 TABLET, FILM COATED ORAL
Qty: 90 | Refills: 0 | Status: ACTIVE | COMMUNITY
Start: 2024-07-08

## 2024-08-19 RX ORDER — CALCIUM CARBONATE 500(1250)
500 TABLET ORAL
Refills: 0 | Status: ACTIVE | COMMUNITY

## 2024-08-19 RX ORDER — OMEPRAZOLE 40 MG/1
40 CAPSULE, DELAYED RELEASE ORAL
Qty: 90 | Refills: 3 | Status: ACTIVE | COMMUNITY
Start: 2024-08-19 | End: 1900-01-01

## 2024-08-19 RX ORDER — CHOLECALCIFEROL (VITAMIN D3) 25 MCG
TABLET ORAL
Refills: 0 | Status: ACTIVE | COMMUNITY

## 2024-08-19 NOTE — HISTORY OF PRESENT ILLNESS
[FreeTextEntry1] : TONI CORNELIUS is a 84 year old female here for a physical exam. [de-identified] : Her last physical exam was last year  Vaccines: Tetanus is up to date, Tdap 6/2022 Pneumococcal vaccination is up to date Shingrix is NOT up to date  Her last dentist visit was within past 6-12 months Her last eye doctor appointment was less than one year ago Her last dermatologist visit was less than one year ago  GYN visit is NOT up to date; last was in her 50s, she defers on further gyn exams at this point in her life Mammogram is no longer indicated for this age; last in 12/17/2021 stable/benign ([facility]) Colon cancer screening is no longer indicated for this age; colon[place], Dr. Arenas is now her GI specialist DEXA is NOT up to date; last >10yrs ago, declines repeat DEXA at this time as she states she would not take Rx meds no matter what and she had no injuries in a signif fall 6/2022 so feels her bone density is reasonably good  Her diet is healthy overall Exercise: keeps as active as she is able to given her caregiving responsibilities.   Ms. Cui has HTN, high cholesterol, osteopenia, COPD, IFG, GERD/chronic cough, OA/chronic pain, and a small thoracic aortic aneurysm (originally incidentally noted in 2022 during imaging in hospital post trauma/fall).  She is UTD on follow up and testing with her specialists including Dr. Lydia Vyas (card), Dr. Monk (neurosurg), and Dr. Vieira (pulm). Dr. Vieira has now retired and she needs new pulm  Her cough initially improved with starting Dulera, then reoccurred. Dr. Vieira recommended incr dose of Dulera and also for her to incr PPI med Omeprazole dose to be increased from 20 mg to 40 mg x 1 mo and then back to 20 mg as in the past GERD flare ups have caused chronic cough for her.   She stopped taking the Dulera as was too costly and did not seem to help her cough. She finds that cough is worsening since lowered Omep dose to 20 mg. Also cough tends to be worse after eating no matter what she eats   1/2023 MRI T spine shows mild OA/DJD/DDD changes. Also a likely hemangioma T11. Given her chronic thoracic pain I recommended she see neurosurg to review MRI results and to get recommendations re other treatment options she can consider since not improving with conservative care.  Dr. Monk recommended PT, massage therapy, Meloxicam prn if tolerated (she filled Rx but has not used), and consideration of trigger point injections for her chronic low back pain. She deferred on the pain mgmt./trigger point injections for now. Is trying to be mindful of lifting techniques and trying to limit lifting etc but caregiving responsibilities for her  require that she do this at times.   She does not find Pt helpful for more than a very short time. Takes 2-4 tylenol daily and that helps some though takes a while to kick in  At last visit 10/2023  (139), HDL 52. RF mildly elevated (slightly trending up, 35, from 20, so will follow it). ESR, CRP wnl.  Her metoprolol dose was incr to 50 mg since last visit here by Dr. Shipman for better BP control. Also now on Rosuvastatin 5 mg daily and her LDL dropped significantly  she states when was checked by card in Spring 2024 (we do not have those lab results). Had Echo Summer 2024 with Dr. Vyas and all was stable/good  Elizabeth's daughter Paula is getting  in a few weeks and she is excited for this.

## 2024-08-19 NOTE — HISTORY OF PRESENT ILLNESS
[FreeTextEntry1] : TONI CORNELIUS is a 84 year old female here for a physical exam. [de-identified] : Her last physical exam was last year  Vaccines: Tetanus is up to date, Tdap 6/2022 Pneumococcal vaccination is up to date Shingrix is NOT up to date  Her last dentist visit was within past 6-12 months Her last eye doctor appointment was less than one year ago Her last dermatologist visit was less than one year ago  GYN visit is NOT up to date; last was in her 50s, she defers on further gyn exams at this point in her life Mammogram is no longer indicated for this age; last in 12/17/2021 stable/benign ([facility]) Colon cancer screening is no longer indicated for this age; colon[place], Dr. Arenas is now her GI specialist DEXA is NOT up to date; last >10yrs ago, declines repeat DEXA at this time as she states she would not take Rx meds no matter what and she had no injuries in a signif fall 6/2022 so feels her bone density is reasonably good  Her diet is healthy overall Exercise: keeps as active as she is able to given her caregiving responsibilities.   Ms. Cui has HTN, high cholesterol, osteopenia, COPD, IFG, GERD/chronic cough, OA/chronic pain, and a small thoracic aortic aneurysm (originally incidentally noted in 2022 during imaging in hospital post trauma/fall).  She is UTD on follow up and testing with her specialists including Dr. Lydia Vyas (card), Dr. Monk (neurosurg), and Dr. Vieira (pulm). Dr. Vieira has now retired and she needs new pulm  Her cough initially improved with starting Dulera, then reoccurred. Dr. Vieira recommended incr dose of Dulera and also for her to incr PPI med Omeprazole dose to be increased from 20 mg to 40 mg x 1 mo and then back to 20 mg as in the past GERD flare ups have caused chronic cough for her.   She stopped taking the Dulera as was too costly and did not seem to help her cough. She finds that cough is worsening since lowered Omep dose to 20 mg. Also cough tends to be worse after eating no matter what she eats   1/2023 MRI T spine shows mild OA/DJD/DDD changes. Also a likely hemangioma T11. Given her chronic thoracic pain I recommended she see neurosurg to review MRI results and to get recommendations re other treatment options she can consider since not improving with conservative care.  Dr. Monk recommended PT, massage therapy, Meloxicam prn if tolerated (she filled Rx but has not used), and consideration of trigger point injections for her chronic low back pain. She deferred on the pain mgmt./trigger point injections for now. Is trying to be mindful of lifting techniques and trying to limit lifting etc but caregiving responsibilities for her  require that she do this at times.   She does not find Pt helpful for more than a very short time. Takes 2-4 tylenol daily and that helps some though takes a while to kick in  At last visit 10/2023  (139), HDL 52. RF mildly elevated (slightly trending up, 35, from 20, so will follow it). ESR, CRP wnl.  Her metoprolol dose was incr to 50 mg since last visit here by Dr. Shipman for better BP control. Also now on Rosuvastatin 5 mg daily and her LDL dropped significantly  she states when was checked by card in Spring 2024 (we do not have those lab results). Had Echo Summer 2024 with Dr. Vyas and all was stable/good  Elizabeth's daughter Paula is getting  in a few weeks and she is excited for this.

## 2024-08-19 NOTE — HEALTH RISK ASSESSMENT
[No falls in past year] : Patient reported no falls in the past year [0] : 2) Feeling down, depressed, or hopeless: Not at all (0) [PHQ-2 Negative - No further assessment needed] : PHQ-2 Negative - No further assessment needed [Never] : Never [[id number]] : 0

## 2024-08-19 NOTE — PLAN
[FreeTextEntry1] : Continue all medications as prescribed. Check labs as above. Will adjust any medications based upon lab results.  Reviewed age-appropriate preventive screening tests with patient. Routine gyn exam/pap, mammogram and CRC screening no longer medically indicated at this stage of life.  Due for DEXA but she declines repeat test at this point in her life as she states she would not take Rx med for bones no matter what T scores were; she will let me know if she changes her mind.  Revd/recommended Shingrix series.   BP goal is <=135/85 on average on home checks. Advised to let us know if BPs are above goal on home checks.   Lifestyle measures to optimize BP reviewed, including regular exercise, adequate sleep, Mediterranean or DASH style clean eating, mindfulness/meditation, magnesium 100-400 mg supplementation, avoid NSAIDS, stress management techniques etc.  LDL goal <=100 ideally. Reviewed risks/benefits of statin med. Recommended excellent hydration +/- co Q10 (100-400 mg daily) to decrease risk for statin related myalgias.   Discussed clean eating (eg Mediterranean style plant based eating plan) and regular exercise/staying as physically active as possible.  Include balance exercises and strength training and core strengthening exercises for bone health and to decrease risk for falls.  Recommended calcium 8064-8254 mg daily ideally mainly or fully from food sources +/- supplement if needed, vit D 3804-9335 IU or whatever dose is needed to get D into 30-80 range. Recommended regular weight bearing exercise as well as strength training exercise 3 or more times per week and balance exercises regularly.  Recommended Tylenol XS or Arthritis 1-2 pills BID-TID if helpful, Should avoid NSAIDs given her age/HTN/GERD ideally. If must use should limit as much as poss and always take with food (and revd r/b/se of NSAIDs incl CV, renal and GI etc), regular stretching, heat/ice prn, consider turmeric supplementation, consider CBD cream or oral options, gentle yoga/chair yoga, Pilates, strengthen core muscles, consider chiro and/or massage and/or acupuncture. If these measures are not helpful enough then consider consultation with ortho and/or pain  for further eval and treatment.  General treatment recommendations for GERD include the following: -  Maintain a healthy weight. Excess pounds put pressure on your abdomen, pushing up your stomach and causing acid to reflux into your esophagus. -  Stop smoking. Smoking decreases the lower esophageal sphincter's ability to function properly. -  Elevate the head of your bed. If you regularly experience heartburn while trying to sleep, place wood or cement blocks under the feet at the head end of your bed. Raise the head end by 6 to 9 inches. If you can't elevate your bed, you can insert a wedge between your mattress and box spring to elevate your body from the waist up. Raising your head with additional pillows isn't effective. -  Start on your left side. When you go to bed, start by lying on your left side to help make it less likely that you will have reflux. -  Don't lie down after a meal. Wait at least three hours after eating before lying down or going to bed. -  Eat food slowly and chew thoroughly. Put down your fork after every bite and pick it up again once you have chewed and swallowed that bite. -  Avoid foods and drinks that trigger reflux. Common triggers include alcohol, chocolate, caffeine, fatty foods or peppermint. -  Avoid tight-fitting clothing. Clothes that fit tightly around your waist put pressure on your abdomen and the lower esophageal sphincter.   In addition to the recommendations above, over the counter medications such as H2 blockers (e.g. Famotidine/Pepcid) and PPIs (Prilosec, Prevacid, Nexium, et. al. and their generic equivalents) can be helpful. H2 blockers may be used PRN but PPIs are generally taken daily for several days or weeks. If a PPI is necessary to control GERD symptoms for longer than 4 weeks, follow up with a gastroenterologist is recommended to discuss having an upper endoscopy.   Reviewed that generally the goal is to try to get to the lowest level of med use that will keep symptoms well controlled to minimize SE (eg H2 blocker med daily is preferable to PPI med daily, H2 blocker med prn is preferable to H2 blocker med daily etc). INcrease omeprazole to 40 mg and she will sched with Dr. Arenas for consideration of EGD  Reviewed importance of good self care (e.g. meditation, yoga, adequate rest, regular exercise, magnesium, clean eating, etc.).  Follow up with specialists as recommended by them. Dr. Vieira has retired and I gave her names of [facility] team locally for her to schedule pulm f/u in near future.   Follow up for next physical in one year. Schedule RPA visit (HTN, chol etc) and repeat fasting labs in about 6 months.

## 2024-08-19 NOTE — HEALTH RISK ASSESSMENT
[No falls in past year] : Patient reported no falls in the past year [0] : 2) Feeling down, depressed, or hopeless: Not at all (0) [PHQ-2 Negative - No further assessment needed] : PHQ-2 Negative - No further assessment needed [Never] : Never [[id number]] : 0

## 2024-08-19 NOTE — PHYSICAL EXAM
[No Acute Distress] : no acute distress [Well Developed] : well developed [Well-Appearing] : well-appearing [Normal Sclera/Conjunctiva] : normal sclera/conjunctiva [EOMI] : extraocular movements intact [Normal Outer Ear/Nose] : the outer ears and nose were normal in appearance [No JVD] : no jugular venous distention [No Lymphadenopathy] : no lymphadenopathy [Supple] : supple [Thyroid Normal, No Nodules] : the thyroid was normal and there were no nodules present [No Respiratory Distress] : no respiratory distress  [No Accessory Muscle Use] : no accessory muscle use [Clear to Auscultation] : lungs were clear to auscultation bilaterally [Normal Rate] : normal rate  [Regular Rhythm] : with a regular rhythm [Normal S1, S2] : normal S1 and S2 [No Murmur] : no murmur heard [No Carotid Bruits] : no carotid bruits [No Varicosities] : no varicosities [Pedal Pulses Present] : the pedal pulses are present [No Edema] : there was no peripheral edema [No Extremity Clubbing/Cyanosis] : no extremity clubbing/cyanosis [Soft] : abdomen soft [Non Tender] : non-tender [Non-distended] : non-distended [No Masses] : no abdominal mass palpated [No HSM] : no HSM [Normal Bowel Sounds] : normal bowel sounds [Normal Posterior Cervical Nodes] : no posterior cervical lymphadenopathy [Normal Anterior Cervical Nodes] : no anterior cervical lymphadenopathy [No CVA Tenderness] : no CVA  tenderness [No Spinal Tenderness] : no spinal tenderness [No Joint Swelling] : no joint swelling [Grossly Normal Strength/Tone] : grossly normal strength/tone [No Rash] : no rash [No Skin Lesions] : no skin lesions [Coordination Grossly Intact] : coordination grossly intact [No Focal Deficits] : no focal deficits [Normal Gait] : normal gait [Normal Affect] : the affect was normal [Normal Insight/Judgement] : insight and judgment were intact [de-identified] : mildly obese but wgt stable, occas cough noted throughout visit, no resp distress [de-identified] : lungs CTA B with regular breathing but with forceful cough there are scattered exp wheezes [id number]), no crackles, no rhonchi, good AE  [de-identified] : no s/sxs acute synovitis, +heberden's nodes multiple DIP joints

## 2024-08-19 NOTE — PLAN
[FreeTextEntry1] : Continue all medications as prescribed. Check labs as above. Will adjust any medications based upon lab results.  Reviewed age-appropriate preventive screening tests with patient. Routine gyn exam/pap, mammogram and CRC screening no longer medically indicated at this stage of life.  Due for DEXA but she declines repeat test at this point in her life as she states she would not take Rx med for bones no matter what T scores were; she will let me know if she changes her mind.  Revd/recommended Shingrix series.   BP goal is <=135/85 on average on home checks. Advised to let us know if BPs are above goal on home checks.   Lifestyle measures to optimize BP reviewed, including regular exercise, adequate sleep, Mediterranean or DASH style clean eating, mindfulness/meditation, magnesium 100-400 mg supplementation, avoid NSAIDS, stress management techniques etc.  LDL goal <=100 ideally. Reviewed risks/benefits of statin med. Recommended excellent hydration +/- co Q10 (100-400 mg daily) to decrease risk for statin related myalgias.   Discussed clean eating (eg Mediterranean style plant based eating plan) and regular exercise/staying as physically active as possible.  Include balance exercises and strength training and core strengthening exercises for bone health and to decrease risk for falls.  Recommended calcium 7563-2670 mg daily ideally mainly or fully from food sources +/- supplement if needed, vit D 2739-8899 IU or whatever dose is needed to get D into 30-80 range. Recommended regular weight bearing exercise as well as strength training exercise 3 or more times per week and balance exercises regularly.  Recommended Tylenol XS or Arthritis 1-2 pills BID-TID if helpful, Should avoid NSAIDs given her age/HTN/GERD ideally. If must use should limit as much as poss and always take with food (and revd r/b/se of NSAIDs incl CV, renal and GI etc), regular stretching, heat/ice prn, consider turmeric supplementation, consider CBD cream or oral options, gentle yoga/chair yoga, Pilates, strengthen core muscles, consider chiro and/or massage and/or acupuncture. If these measures are not helpful enough then consider consultation with ortho and/or pain  for further eval and treatment.  General treatment recommendations for GERD include the following: -  Maintain a healthy weight. Excess pounds put pressure on your abdomen, pushing up your stomach and causing acid to reflux into your esophagus. -  Stop smoking. Smoking decreases the lower esophageal sphincter's ability to function properly. -  Elevate the head of your bed. If you regularly experience heartburn while trying to sleep, place wood or cement blocks under the feet at the head end of your bed. Raise the head end by 6 to 9 inches. If you can't elevate your bed, you can insert a wedge between your mattress and box spring to elevate your body from the waist up. Raising your head with additional pillows isn't effective. -  Start on your left side. When you go to bed, start by lying on your left side to help make it less likely that you will have reflux. -  Don't lie down after a meal. Wait at least three hours after eating before lying down or going to bed. -  Eat food slowly and chew thoroughly. Put down your fork after every bite and pick it up again once you have chewed and swallowed that bite. -  Avoid foods and drinks that trigger reflux. Common triggers include alcohol, chocolate, caffeine, fatty foods or peppermint. -  Avoid tight-fitting clothing. Clothes that fit tightly around your waist put pressure on your abdomen and the lower esophageal sphincter.   In addition to the recommendations above, over the counter medications such as H2 blockers (e.g. Famotidine/Pepcid) and PPIs (Prilosec, Prevacid, Nexium, et. al. and their generic equivalents) can be helpful. H2 blockers may be used PRN but PPIs are generally taken daily for several days or weeks. If a PPI is necessary to control GERD symptoms for longer than 4 weeks, follow up with a gastroenterologist is recommended to discuss having an upper endoscopy.   Reviewed that generally the goal is to try to get to the lowest level of med use that will keep symptoms well controlled to minimize SE (eg H2 blocker med daily is preferable to PPI med daily, H2 blocker med prn is preferable to H2 blocker med daily etc). INcrease omeprazole to 40 mg and she will sched with Dr. Arenas for consideration of EGD  Reviewed importance of good self care (e.g. meditation, yoga, adequate rest, regular exercise, magnesium, clean eating, etc.).  Follow up with specialists as recommended by them. Dr. Vieira has retired and I gave her names of [facility] team locally for her to schedule pulm f/u in near future.   Follow up for next physical in one year. Schedule RPA visit (HTN, chol etc) and repeat fasting labs in about 6 months.

## 2024-08-19 NOTE — ASSESSMENT
[FreeTextEntry1] : TONI CUI is a 84 year old female here for a physical exam.  She is also here to follow up on medical issues as noted above.  Ms. Cui has HTN, high cholesterol, COPD, osteopenia, IFG, GERD/chronic cough, OA/chronic pain, and a small thoracic aortic aneurysm (originally incidentally noted in 2022 during imaging in hospital post trauma/fall).  CHronic cough x yrs, See prior notes fro details. Has seen multiple providers for this and no definitive treatment that resolved her cough has ever been able to be found.   --Increase Omeprazole back to 40 mg as that dose seemed to work better than 20 mg dose even though still had cough. Cough sounds like it is more of a GI etiology than COPD/pulm etiology since is being triggered by eating.  --She has not seen Dr. Arenas in a few yrs and will f/u with him in coming months. ?Need for EGD for further eval --Will also f/u with pulm to see if pulm med regimen needs to be adjusted

## 2024-08-19 NOTE — REVIEW OF SYSTEMS
[Nasal Discharge] : nasal discharge [Postnasal Drip] : postnasal drip [Cough] : cough [Heartburn] : heartburn [Incontinence] : incontinence [Joint Pain] : joint pain [Joint Stiffness] : joint stiffness [Muscle Pain] : muscle pain [Back Pain] : back pain [Negative] : Heme/Lymph [Discharge] : no discharge [Redness] : no redness [Vision Problems] : no vision problems [Earache] : no earache [Sore Throat] : no sore throat [Chest Pain] : no chest pain [Palpitations] : no palpitations [Lower Ext Edema] : no lower extremity edema [Shortness Of Breath] : no shortness of breath [Dyspnea on Exertion] : no dyspnea on exertion [Abdominal Pain] : no abdominal pain [Nausea] : no nausea [Constipation] : no constipation [Diarrhea] : diarrhea [Vomiting] : no vomiting [Melena] : no melena [Dysuria] : no dysuria [Hematuria] : no hematuria [Anxiety] : no anxiety [Depression] : no depression [FreeTextEntry3] : +macular degeneration. sees retinologist and gets Eyelea injections periodically [FreeTextEntry4] : see HPI [FreeTextEntry6] : see HPI, cough persists and did not improve with Dulera, improved though did not resolve with Omeprazole 40 mg.  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

## 2024-08-19 NOTE — REVIEW OF SYSTEMS
[Nasal Discharge] : nasal discharge [Postnasal Drip] : postnasal drip [Cough] : cough [Heartburn] : heartburn [Incontinence] : incontinence [Joint Pain] : joint pain [Joint Stiffness] : joint stiffness [Muscle Pain] : muscle pain [Back Pain] : back pain [Negative] : Heme/Lymph [Discharge] : no discharge [Redness] : no redness [Vision Problems] : no vision problems [Earache] : no earache [Sore Throat] : no sore throat [Chest Pain] : no chest pain [Palpitations] : no palpitations [Lower Ext Edema] : no lower extremity edema [Shortness Of Breath] : no shortness of breath [Dyspnea on Exertion] : no dyspnea on exertion [Abdominal Pain] : no abdominal pain [Nausea] : no nausea [Constipation] : no constipation [Diarrhea] : diarrhea [Vomiting] : no vomiting [Melena] : no melena [Dysuria] : no dysuria [Hematuria] : no hematuria [Anxiety] : no anxiety [Depression] : no depression [FreeTextEntry3] : +macular degeneration. sees retinologist and gets Eyelea injections periodically [FreeTextEntry4] : see HPI [FreeTextEntry6] : see HPI, cough persists and did not improve with Dulera, improved though did not resolve with Omeprazole 40 mg.  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

## 2024-08-19 NOTE — PHYSICAL EXAM
[No Acute Distress] : no acute distress [Well Developed] : well developed [Well-Appearing] : well-appearing [Normal Sclera/Conjunctiva] : normal sclera/conjunctiva [EOMI] : extraocular movements intact [Normal Outer Ear/Nose] : the outer ears and nose were normal in appearance [No JVD] : no jugular venous distention [No Lymphadenopathy] : no lymphadenopathy [Supple] : supple [Thyroid Normal, No Nodules] : the thyroid was normal and there were no nodules present [No Respiratory Distress] : no respiratory distress  [No Accessory Muscle Use] : no accessory muscle use [Clear to Auscultation] : lungs were clear to auscultation bilaterally [Normal Rate] : normal rate  [Regular Rhythm] : with a regular rhythm [Normal S1, S2] : normal S1 and S2 [No Murmur] : no murmur heard [No Carotid Bruits] : no carotid bruits [No Varicosities] : no varicosities [Pedal Pulses Present] : the pedal pulses are present [No Edema] : there was no peripheral edema [No Extremity Clubbing/Cyanosis] : no extremity clubbing/cyanosis [Soft] : abdomen soft [Non Tender] : non-tender [Non-distended] : non-distended [No Masses] : no abdominal mass palpated [No HSM] : no HSM [Normal Bowel Sounds] : normal bowel sounds [Normal Posterior Cervical Nodes] : no posterior cervical lymphadenopathy [Normal Anterior Cervical Nodes] : no anterior cervical lymphadenopathy [No CVA Tenderness] : no CVA  tenderness [No Spinal Tenderness] : no spinal tenderness [No Joint Swelling] : no joint swelling [Grossly Normal Strength/Tone] : grossly normal strength/tone [No Rash] : no rash [No Skin Lesions] : no skin lesions [Coordination Grossly Intact] : coordination grossly intact [No Focal Deficits] : no focal deficits [Normal Gait] : normal gait [Normal Affect] : the affect was normal [Normal Insight/Judgement] : insight and judgment were intact [de-identified] : mildly obese but wgt stable, occas cough noted throughout visit, no resp distress [de-identified] : lungs CTA B with regular breathing but with forceful cough there are scattered exp wheezes [id number]), no crackles, no rhonchi, good AE  [de-identified] : no s/sxs acute synovitis, +heberden's nodes multiple DIP joints

## 2024-08-21 LAB
ALBUMIN SERPL ELPH-MCNC: 4.5 G/DL
ALP BLD-CCNC: 95 U/L
ALT SERPL-CCNC: 14 U/L
ANION GAP SERPL CALC-SCNC: 13 MMOL/L
AST SERPL-CCNC: 18 U/L
BASOPHILS # BLD AUTO: 0.02 K/UL
BASOPHILS NFR BLD AUTO: 0.3 %
BILIRUB SERPL-MCNC: 0.6 MG/DL
BUN SERPL-MCNC: 16 MG/DL
CALCIUM SERPL-MCNC: 9.4 MG/DL
CHLORIDE SERPL-SCNC: 103 MMOL/L
CHOLEST SERPL-MCNC: 190 MG/DL
CO2 SERPL-SCNC: 24 MMOL/L
CREAT SERPL-MCNC: 0.65 MG/DL
EGFR: 88 ML/MIN/1.73M2
EOSINOPHIL # BLD AUTO: 0.18 K/UL
EOSINOPHIL NFR BLD AUTO: 3.1 %
GLUCOSE SERPL-MCNC: 94 MG/DL
HCT VFR BLD CALC: 43.7 %
HDLC SERPL-MCNC: 59 MG/DL
HGB BLD-MCNC: 14 G/DL
IMM GRANULOCYTES NFR BLD AUTO: 0.3 %
LDLC SERPL CALC-MCNC: 103 MG/DL
LYMPHOCYTES # BLD AUTO: 1.31 K/UL
LYMPHOCYTES NFR BLD AUTO: 22.5 %
MAN DIFF?: NORMAL
MCHC RBC-ENTMCNC: 31.5 PG
MCHC RBC-ENTMCNC: 32 GM/DL
MCV RBC AUTO: 98.4 FL
MONOCYTES # BLD AUTO: 0.69 K/UL
MONOCYTES NFR BLD AUTO: 11.8 %
NEUTROPHILS # BLD AUTO: 3.61 K/UL
NEUTROPHILS NFR BLD AUTO: 62 %
NONHDLC SERPL-MCNC: 131 MG/DL
PLATELET # BLD AUTO: 277 K/UL
POTASSIUM SERPL-SCNC: 4.4 MMOL/L
PROT SERPL-MCNC: 7.2 G/DL
RBC # BLD: 4.44 M/UL
RBC # FLD: 13.5 %
RHEUMATOID FACT SER QL: 17 IU/ML
SODIUM SERPL-SCNC: 139 MMOL/L
TRIGL SERPL-MCNC: 160 MG/DL
TSH SERPL-ACNC: 1.08 UIU/ML
WBC # FLD AUTO: 5.83 K/UL

## 2024-08-28 ENCOUNTER — APPOINTMENT (OUTPATIENT)
Dept: FAMILY MEDICINE | Facility: CLINIC | Age: 84
End: 2024-08-28
Payer: MEDICARE

## 2024-08-28 VITALS
HEART RATE: 77 BPM | TEMPERATURE: 97.3 F | BODY MASS INDEX: 30.48 KG/M2 | DIASTOLIC BLOOD PRESSURE: 80 MMHG | SYSTOLIC BLOOD PRESSURE: 122 MMHG | WEIGHT: 172 LBS | OXYGEN SATURATION: 96 % | HEIGHT: 63 IN

## 2024-08-28 DIAGNOSIS — H00.014 HORDEOLUM EXTERNUM LEFT UPPER EYELID: ICD-10-CM

## 2024-08-28 DIAGNOSIS — R05.9 COUGH, UNSPECIFIED: ICD-10-CM

## 2024-08-28 PROCEDURE — 99213 OFFICE O/P EST LOW 20 MIN: CPT

## 2024-08-28 RX ORDER — BUDESONIDE AND FORMOTEROL FUMARATE DIHYDRATE 160; 4.5 UG/1; UG/1
160-4.5 AEROSOL RESPIRATORY (INHALATION) TWICE DAILY
Qty: 1 | Refills: 0 | Status: ACTIVE | COMMUNITY
Start: 2024-08-28 | End: 1900-01-01

## 2024-08-28 RX ORDER — ERYTHROMYCIN 5 MG/G
5 OINTMENT OPHTHALMIC
Qty: 1 | Refills: 0 | Status: ACTIVE | COMMUNITY
Start: 2024-08-28 | End: 1900-01-01

## 2024-08-28 RX ORDER — ALBUTEROL SULFATE 90 UG/1
108 (90 BASE) INHALANT RESPIRATORY (INHALATION)
Qty: 1 | Refills: 0 | Status: ACTIVE | COMMUNITY
Start: 2024-08-28 | End: 1900-01-01

## 2024-08-28 NOTE — HISTORY OF PRESENT ILLNESS
[FreeTextEntry8] : 1. Cough  - states that has a chronic cough, usually dry  - think acid reflux related  - has seen a pulmonologist in the past, he retired, going to see a new one soon  - tried inhalers in the past which seemed to help slightly but was very expensive  - since last week, developed a burning sensation when coughing  - has been productive since yesterday, clear sputum  - denies SOB, fever but intermittently feels hot flashes  - drinks 4-5 glasses of water per day  2. Stye  - left upper eyelid x 2-3 days  - has been doing hot compresses 4 times a day  - discomfort - denies discharge, vision changes

## 2024-08-28 NOTE — PHYSICAL EXAM
[PERRL] : pupils equal round and reactive to light [Normal] : affect was normal and insight and judgment were intact [de-identified] : left upper eyelid stye, mild redness and swelling, no discharge

## 2024-08-28 NOTE — PLAN
[FreeTextEntry1] : - follow up with pulmonology as planned  - most likely picked up a virus - supportive care - rest, hydration - vitamin C, zinc, Elderberry syrup for immune support  - deep breathing exercises  - if develop worsening symptoms, follow up   - continue warm eye compresses 5 times per day  - avoid wearing eye make up

## 2024-08-28 NOTE — REVIEW OF SYSTEMS
[Cough] : cough [Negative] : Psychiatric [Shortness Of Breath] : no shortness of breath [Wheezing] : no wheezing [FreeTextEntry3] : stye on left upper eyelid

## 2024-09-12 ENCOUNTER — RX RENEWAL (OUTPATIENT)
Age: 84
End: 2024-09-12

## 2024-09-30 ENCOUNTER — RX RENEWAL (OUTPATIENT)
Age: 84
End: 2024-09-30

## 2024-10-17 ENCOUNTER — RX RENEWAL (OUTPATIENT)
Age: 84
End: 2024-10-17

## 2024-11-07 ENCOUNTER — RX RENEWAL (OUTPATIENT)
Age: 84
End: 2024-11-07

## 2024-11-25 ENCOUNTER — RX RENEWAL (OUTPATIENT)
Age: 84
End: 2024-11-25

## 2024-12-09 ENCOUNTER — RX RENEWAL (OUTPATIENT)
Age: 84
End: 2024-12-09

## 2024-12-23 ENCOUNTER — RX RENEWAL (OUTPATIENT)
Age: 84
End: 2024-12-23

## 2025-01-13 ENCOUNTER — RX RENEWAL (OUTPATIENT)
Age: 85
End: 2025-01-13

## 2025-01-27 ENCOUNTER — RX RENEWAL (OUTPATIENT)
Age: 85
End: 2025-01-27

## 2025-02-17 ENCOUNTER — RX RENEWAL (OUTPATIENT)
Age: 85
End: 2025-02-17

## 2025-02-18 ENCOUNTER — APPOINTMENT (OUTPATIENT)
Dept: FAMILY MEDICINE | Facility: CLINIC | Age: 85
End: 2025-02-18
Payer: MEDICARE

## 2025-02-18 VITALS
TEMPERATURE: 97.3 F | BODY MASS INDEX: 30.48 KG/M2 | DIASTOLIC BLOOD PRESSURE: 72 MMHG | OXYGEN SATURATION: 100 % | HEART RATE: 61 BPM | SYSTOLIC BLOOD PRESSURE: 128 MMHG | HEIGHT: 63 IN | WEIGHT: 172 LBS

## 2025-02-18 DIAGNOSIS — E78.00 PURE HYPERCHOLESTEROLEMIA, UNSPECIFIED: ICD-10-CM

## 2025-02-18 DIAGNOSIS — I10 ESSENTIAL (PRIMARY) HYPERTENSION: ICD-10-CM

## 2025-02-18 DIAGNOSIS — F32.A DEPRESSION, UNSPECIFIED: ICD-10-CM

## 2025-02-18 DIAGNOSIS — J44.9 CHRONIC OBSTRUCTIVE PULMONARY DISEASE, UNSPECIFIED: ICD-10-CM

## 2025-02-18 PROCEDURE — 99214 OFFICE O/P EST MOD 30 MIN: CPT

## 2025-02-18 PROCEDURE — 36415 COLL VENOUS BLD VENIPUNCTURE: CPT

## 2025-02-18 RX ORDER — SERTRALINE 25 MG/1
25 TABLET, FILM COATED ORAL DAILY
Qty: 90 | Refills: 0 | Status: ACTIVE | COMMUNITY
Start: 2025-02-18 | End: 1900-01-01

## 2025-02-19 LAB
ALBUMIN SERPL ELPH-MCNC: 4.2 G/DL
ALP BLD-CCNC: 96 U/L
ALT SERPL-CCNC: 12 U/L
ANION GAP SERPL CALC-SCNC: 9 MMOL/L
AST SERPL-CCNC: 13 U/L
BILIRUB SERPL-MCNC: 0.5 MG/DL
BUN SERPL-MCNC: 17 MG/DL
CALCIUM SERPL-MCNC: 9.3 MG/DL
CHLORIDE SERPL-SCNC: 104 MMOL/L
CHOLEST SERPL-MCNC: 163 MG/DL
CO2 SERPL-SCNC: 26 MMOL/L
CREAT SERPL-MCNC: 0.7 MG/DL
EGFR: 86 ML/MIN/1.73M2
GLUCOSE SERPL-MCNC: 92 MG/DL
HDLC SERPL-MCNC: 50 MG/DL
LDLC SERPL CALC-MCNC: 80 MG/DL
NONHDLC SERPL-MCNC: 113 MG/DL
POTASSIUM SERPL-SCNC: 4.5 MMOL/L
PROT SERPL-MCNC: 6.7 G/DL
SODIUM SERPL-SCNC: 139 MMOL/L
TRIGL SERPL-MCNC: 193 MG/DL

## 2025-03-03 ENCOUNTER — RX RENEWAL (OUTPATIENT)
Age: 85
End: 2025-03-03

## 2025-03-25 ENCOUNTER — RX RENEWAL (OUTPATIENT)
Age: 85
End: 2025-03-25

## 2025-04-16 ENCOUNTER — RX RENEWAL (OUTPATIENT)
Age: 85
End: 2025-04-16

## 2025-05-22 ENCOUNTER — RX RENEWAL (OUTPATIENT)
Age: 85
End: 2025-05-22

## 2025-07-28 ENCOUNTER — EMERGENCY (EMERGENCY)
Facility: HOSPITAL | Age: 85
LOS: 0 days | Discharge: HOME | End: 2025-07-29
Attending: STUDENT IN AN ORGANIZED HEALTH CARE EDUCATION/TRAINING PROGRAM
Payer: MEDICARE

## 2025-07-28 VITALS
OXYGEN SATURATION: 95 % | RESPIRATION RATE: 18 BRPM | SYSTOLIC BLOOD PRESSURE: 117 MMHG | TEMPERATURE: 98 F | HEART RATE: 68 BPM | DIASTOLIC BLOOD PRESSURE: 66 MMHG

## 2025-07-28 DIAGNOSIS — Z88.1 ALLERGY STATUS TO OTHER ANTIBIOTIC AGENTS: ICD-10-CM
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
## 2025-07-28 DIAGNOSIS — M79.642 PAIN IN LEFT HAND: ICD-10-CM

## 2025-07-28 DIAGNOSIS — R10.11 RIGHT UPPER QUADRANT PAIN: ICD-10-CM

## 2025-07-28 DIAGNOSIS — K80.20 CALCULUS OF GALLBLADDER WITHOUT CHOLECYSTITIS WITHOUT OBSTRUCTION: ICD-10-CM

## 2025-07-28 DIAGNOSIS — S80.11XA CONTUSION OF RIGHT LOWER LEG, INITIAL ENCOUNTER: ICD-10-CM
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
## 2025-07-28 DIAGNOSIS — S60.052A CONTUSION OF LEFT LITTLE FINGER WITHOUT DAMAGE TO NAIL, INITIAL ENCOUNTER: ICD-10-CM

## 2025-07-28 LAB
HCT VFR BLD CALC: 38.4 % — SIGNIFICANT CHANGE UP (ref 34.5–45)
HGB BLD-MCNC: 12.8 G/DL — SIGNIFICANT CHANGE UP (ref 11.5–15.5)
MCHC RBC-ENTMCNC: 32.1 PG — SIGNIFICANT CHANGE UP (ref 27–34)
MCHC RBC-ENTMCNC: 33.3 G/DL — SIGNIFICANT CHANGE UP (ref 32–36)
MCV RBC AUTO: 96.2 FL — SIGNIFICANT CHANGE UP (ref 80–100)
NRBC # BLD AUTO: 0 K/UL — SIGNIFICANT CHANGE UP (ref 0–0)
NRBC # FLD: 0 K/UL — SIGNIFICANT CHANGE UP (ref 0–0)
NRBC BLD AUTO-RTO: 0 /100 WBCS — SIGNIFICANT CHANGE UP (ref 0–0)
PLATELET # BLD AUTO: 274 K/UL — SIGNIFICANT CHANGE UP (ref 150–400)
PMV BLD: 9.4 FL — SIGNIFICANT CHANGE UP (ref 7–13)
RBC # BLD: 3.99 M/UL — SIGNIFICANT CHANGE UP (ref 3.8–5.2)
RBC # FLD: 12.5 % — SIGNIFICANT CHANGE UP (ref 10.3–14.5)
WBC # BLD: 8.94 K/UL — SIGNIFICANT CHANGE UP (ref 3.8–10.5)
WBC # FLD AUTO: 8.94 K/UL — SIGNIFICANT CHANGE UP (ref 3.8–10.5)

## 2025-07-28 PROCEDURE — 73130 X-RAY EXAM OF HAND: CPT | Mod: 26,LT

## 2025-07-28 PROCEDURE — 99284 EMERGENCY DEPT VISIT MOD MDM: CPT

## 2025-07-28 PROCEDURE — 73130 X-RAY EXAM OF HAND: CPT | Mod: LT

## 2025-07-28 PROCEDURE — 85027 COMPLETE CBC AUTOMATED: CPT

## 2025-07-28 PROCEDURE — 83690 ASSAY OF LIPASE: CPT

## 2025-07-28 PROCEDURE — 73610 X-RAY EXAM OF ANKLE: CPT | Mod: RT

## 2025-07-28 PROCEDURE — 71045 X-RAY EXAM CHEST 1 VIEW: CPT | Mod: 26

## 2025-07-28 PROCEDURE — 76705 ECHO EXAM OF ABDOMEN: CPT | Mod: 26

## 2025-07-28 PROCEDURE — 73610 X-RAY EXAM OF ANKLE: CPT | Mod: 26,RT

## 2025-07-28 PROCEDURE — 73590 X-RAY EXAM OF LOWER LEG: CPT | Mod: RT

## 2025-07-28 PROCEDURE — 72125 CT NECK SPINE W/O DYE: CPT

## 2025-07-28 PROCEDURE — 36415 COLL VENOUS BLD VENIPUNCTURE: CPT

## 2025-07-28 PROCEDURE — 70450 CT HEAD/BRAIN W/O DYE: CPT

## 2025-07-28 PROCEDURE — 70450 CT HEAD/BRAIN W/O DYE: CPT | Mod: 26

## 2025-07-28 PROCEDURE — 76705 ECHO EXAM OF ABDOMEN: CPT

## 2025-07-28 PROCEDURE — 71045 X-RAY EXAM CHEST 1 VIEW: CPT

## 2025-07-28 PROCEDURE — 80053 COMPREHEN METABOLIC PANEL: CPT

## 2025-07-28 PROCEDURE — 73590 X-RAY EXAM OF LOWER LEG: CPT | Mod: 26,RT

## 2025-07-28 PROCEDURE — 99284 EMERGENCY DEPT VISIT MOD MDM: CPT | Mod: 25

## 2025-07-28 PROCEDURE — 72125 CT NECK SPINE W/O DYE: CPT | Mod: 26

## 2025-07-28 RX ORDER — ACETAMINOPHEN 500 MG/5ML
650 LIQUID (ML) ORAL ONCE
Refills: 0 | Status: COMPLETED | OUTPATIENT
Start: 2025-07-28 | End: 2025-07-28

## 2025-07-28 NOTE — ED ADULT NURSE NOTE - CHIEF COMPLAINT QUOTE
pt biba from home s/p trip and fall. pt landed on left hand; endorsing left hand and right leg pain. unknown head strike; pt states "I think I just hit my glasses into my face". no loc. a&o x4. neuro alert called by dr ulloa at 3089 and pt brought to ct

## 2025-07-28 NOTE — ED ADULT NURSE NOTE - NS ED NOTE ABUSE SUSPICION NEGLECT YN
Onset: Last night      Location / description: Pt states has been feeling dizzy since last night. Woke up and still having feeling of room spinning whenever she opens her eyes. Pt afraid she will fall due to dizziness.     122/88 was BP reading this morning     Precipitating Factors: hx of dizziness    Pain Scale (1-10), 10 highest: 0/10    What improves/worsens symptoms: opening eyes and moving head worsens symptoms     Symptom specific medications: Meclizine taken last night unsure if this has helped     LMP : Only if pertains to symptom. NA     Are you pregnant or breast feeding: NA     Recent visits (last 3-4 weeks) for same reason or recent surgery:  No recent visits for same       PLAN:    Go to the Emergency Department    Patient/Caller agrees to follow recommendations.     Reason for Disposition   SEVERE dizziness (e.g., unable to stand, requires support to walk, feels like passing out now)    Protocols used: Dizziness-A-OH     No

## 2025-07-28 NOTE — ED ADULT TRIAGE NOTE - CHIEF COMPLAINT QUOTE
pt biba from home s/p trip and fall. pt landed on left hand; endorsing left hand and right leg pain. unknown head strike; pt states "I think I just hit my glasses into my face". no loc. a&o x4. neuro alert called by dr ulloa at 1989 and pt brought to ct

## 2025-07-29 ENCOUNTER — APPOINTMENT (OUTPATIENT)
Dept: FAMILY MEDICINE | Facility: CLINIC | Age: 85
End: 2025-07-29
Payer: MEDICARE

## 2025-07-29 VITALS
TEMPERATURE: 98 F | HEART RATE: 68 BPM | OXYGEN SATURATION: 97 % | RESPIRATION RATE: 18 BRPM | DIASTOLIC BLOOD PRESSURE: 75 MMHG | SYSTOLIC BLOOD PRESSURE: 140 MMHG

## 2025-07-29 VITALS
HEART RATE: 76 BPM | BODY MASS INDEX: 30.12 KG/M2 | DIASTOLIC BLOOD PRESSURE: 74 MMHG | HEIGHT: 63 IN | SYSTOLIC BLOOD PRESSURE: 122 MMHG | OXYGEN SATURATION: 96 % | TEMPERATURE: 97.8 F | WEIGHT: 170 LBS | RESPIRATION RATE: 16 BRPM

## 2025-07-29 DIAGNOSIS — M79.604 PAIN IN RIGHT LEG: ICD-10-CM

## 2025-07-29 DIAGNOSIS — Z91.81 HISTORY OF FALLING: ICD-10-CM

## 2025-07-29 DIAGNOSIS — K76.89 OTHER SPECIFIED DISEASES OF LIVER: ICD-10-CM

## 2025-07-29 DIAGNOSIS — M79.642 PAIN IN LEFT HAND: ICD-10-CM

## 2025-07-29 LAB
ALBUMIN SERPL ELPH-MCNC: 3.6 G/DL — SIGNIFICANT CHANGE UP (ref 3.3–5)
ALP SERPL-CCNC: 89 U/L — SIGNIFICANT CHANGE UP (ref 40–120)
ALT FLD-CCNC: 26 U/L — SIGNIFICANT CHANGE UP (ref 12–78)
ANION GAP SERPL CALC-SCNC: 7 MMOL/L — SIGNIFICANT CHANGE UP (ref 5–17)
AST SERPL-CCNC: 18 U/L — SIGNIFICANT CHANGE UP (ref 15–37)
BILIRUB SERPL-MCNC: 0.4 MG/DL — SIGNIFICANT CHANGE UP (ref 0.2–1.2)
BUN SERPL-MCNC: 23 MG/DL — SIGNIFICANT CHANGE UP (ref 7–23)
CALCIUM SERPL-MCNC: 9.2 MG/DL — SIGNIFICANT CHANGE UP (ref 8.5–10.1)
CHLORIDE SERPL-SCNC: 108 MMOL/L — SIGNIFICANT CHANGE UP (ref 96–108)
CO2 SERPL-SCNC: 24 MMOL/L — SIGNIFICANT CHANGE UP (ref 22–31)
CREAT SERPL-MCNC: 0.84 MG/DL — SIGNIFICANT CHANGE UP (ref 0.5–1.3)
EGFR: 69 ML/MIN/1.73M2 — SIGNIFICANT CHANGE UP
EGFR: 69 ML/MIN/1.73M2 — SIGNIFICANT CHANGE UP
GLUCOSE SERPL-MCNC: 115 MG/DL — HIGH (ref 70–99)
LIDOCAIN IGE QN: 53 U/L — SIGNIFICANT CHANGE UP (ref 13–75)
POTASSIUM SERPL-MCNC: 4.4 MMOL/L — SIGNIFICANT CHANGE UP (ref 3.5–5.3)
POTASSIUM SERPL-SCNC: 4.4 MMOL/L — SIGNIFICANT CHANGE UP (ref 3.5–5.3)
PROT SERPL-MCNC: 7 GM/DL — SIGNIFICANT CHANGE UP (ref 6–8.3)
SODIUM SERPL-SCNC: 139 MMOL/L — SIGNIFICANT CHANGE UP (ref 135–145)

## 2025-07-29 PROCEDURE — 99214 OFFICE O/P EST MOD 30 MIN: CPT

## 2025-07-29 RX ORDER — OXYCODONE HYDROCHLORIDE 5 MG/1
5 CAPSULE ORAL EVERY 6 HOURS
Qty: 20 | Refills: 0 | Status: ACTIVE | COMMUNITY
Start: 2025-07-29 | End: 1900-01-01

## 2025-07-29 NOTE — ED PROVIDER NOTE - CLINICAL SUMMARY MEDICAL DECISION MAKING FREE TEXT BOX
pt presenting after mechnical fall  CT head/spine negative for acute pathology, xrays of areas of bony tenderness without any fractures. able to ambualte at baseline.    right upper quadrant US obttained to evaluate for bilairy colic, anticipate outpatient surgery followup if no evidence of cholecystitis.

## 2025-07-29 NOTE — ED PROVIDER NOTE - PROGRESS NOTE DETAILS
Attending Nereida Ware MD: Received patient in signout from Dr. Pierre.  Applied Ace bandage to left hand secondary to ecchymosis pain.  Reviewed ultrasound results which showed cholelithiasis notify general surgery follow-up.  Will provide Ortho follow-up for hand pain

## 2025-07-29 NOTE — ED PROVIDER NOTE - PATIENT PORTAL LINK FT
You can access the FollowMyHealth Patient Portal offered by [facility] by registering at the following website: [email or web address]. By joining [facility]’s FollowMyHealth portal, you will also be able to view your health information using other applications (apps) compatible with our system.

## 2025-07-29 NOTE — ED PROVIDER NOTE - CARE PROVIDER_API CALL
Geovanni Jeffers ()  Surgery (General Surgery)  [address] [place] [address]  Phone: [phone]  Fax: [phone]  Follow Up Time:     Laith Chavez)  Orthopaedic Surgery  [address] [place] [id number]  Phone: [phone]  Fax: [phone]  Follow Up Time:

## 2025-07-29 NOTE — ED PROVIDER NOTE - OBJECTIVE STATEMENT
85y  hx knee replacement presenting after mechanical trip and fall hitting her face, hand and lower leg. able to ambulate since, denies headache, nausea./vomiting, confusion, chest pain or sob. currently has pain to shin as well as to hand, mild. denies AC use. PAtient also endorses intermittent right upper quadrant pain not associated with the fall but has been having it since arrival to ED. unsure if it worsens with eating, no N/v/d or fevers.

## 2025-07-29 NOTE — ED PROVIDER NOTE - NSFOLLOWUPINSTRUCTIONS_ED_ALL_ED_FT
You were seen in the ED after a fall   You sustained a contusion to your left hand, you should follow up with your primary care doctor and a Hand doctor for further monitoring. You can use tylenol/motrin for pain control and apply ice to the area   Return to the ED for intractable pain, swelling, numbness in that area     You were also diagnosed with gallstones  You can follow up with a general surgeon to evaluate if you need the gallbladder removed     Cholelithiasis  Body outline showing the digestive system with a close-up of the gallbladder with gallstones in it.   Cholelithiasis is a disease in which gallstones form in the gallbladder. The gallbladder is an organ that stores bile. Bile is a fluid that helps to digest fats. Gallstones begin as small crystals and can slowly grow into stones. They may cause no symptoms until they block the gallbladder duct, or cystic duct, when the gallbladder tightens, or contracts, after food is eaten. This can cause pain and is known as a gallbladder attack, or biliary colic.    There are two main types of gallstones:  Cholesterol stones. These are the most common type of gallstone. These stones are made of hardened cholesterol and are usually yellow-green in color.  Pigment stones. These are dark in color and are made of a red-yellow substance, called bilirubin,that forms when hemoglobin from red blood cells breaks down.  What are the causes?  This condition may be caused by too little or too much of the substances that are in bile. This can happen if the bile:  Has too much bilirubin. This can happen in certain blood diseases, such as sickle cell anemia.  Has too much cholesterol.  Does not have enough bile salts. These salts help the body absorb and digest fats.  It can also happen if the gallbladder is not emptying completely. This is common during pregnancy.    What increases the risk?  The following factors may make you more likely to develop this condition:  Being older than 40 years of age.  Eating a diet that is heavy in fried foods, fat, and refined carbohydrates, such as white bread and white rice.  Being female.  Having multiple pregnancies.  Using medicines that contain female hormones (estrogen) for a long time.  Having certain medical problems, such as:  Diabetes mellitus.  Obesity.  Cystic fibrosis.  Crohn's disease.  Cirrhosis or other long-term (chronic) liver disease.  Certain blood diseases, such as sickle cell anemia or leukemia.  Having a family history of gallstones.  Losing weight quickly.  What are the signs or symptoms?  In many cases, having gallstones causes no symptoms. These are called silent gallstones. If a gallstone blocks your bile duct, it can cause a gallbladder attack. The main symptom of a gallbladder attack is sudden pain in the upper right part of the abdomen. The pain:  Usually comes at night or after eating.  Can last for one hour or more.  Can spread to your right shoulder, back, or chest.  Can feel like indigestion. This is discomfort, burning, or fullness in your upper abdomen.  If the bile duct is blocked for more than a few hours, it can cause an infection or inflammation of your gallbladder (cholecystitis), liver, or pancreas. This can cause:  Nausea or vomiting.  Bloating.  Pain in your abdomen that lasts for 5 hours or longer.  Tenderness in your upper abdomen, often in the upper right section and under your rib cage.  Fever or chills.  Skin or the white parts of your eyes turning yellow (jaundice). This usually happens when a stone has blocked bile from passing through the bile duct.  Dark pee (urine) or light-colored poop (stools).  How is this diagnosed?  This condition may be diagnosed based on:  A physical exam.  Your medical history.  Ultrasound.  CT scan.  MRI.  You may also have other tests, including:  Blood tests to check for infection or inflammation.  The HIDA scan to see the gallbladder and the bile ducts.  An endoscope to check for blockage in the bile ducts.  How is this treated?  Treatment depends on the severity of your symptoms. Silent gallstones do not need treatment. You may need treatment if a blockage causes a gallbladder attack or other symptoms. Treatment may include:  If symptoms are mild, you may care for yourself at home. For mild symptoms:  Stop eating and drinking for 12–24 hours. You may drink water and clear liquids. This helps to "cool down" your gallbladder. After 1 or 2 days, eat a diet of simple or clear foods, such as broths and crackers.  Take medicines for pain or nausea.  Take antibiotics if you have an infection.  If symptoms are severe, you may:  Stay in the hospital for pain control or to treat severe infection.  Have surgery to remove the gallbladder (cholecystectomy). This is the most common treatment if all other treatments have not worked.  Take medicines to break up gallstones. Medicines may be used for up to 6–12 months.  Have an procedure to capture and remove gallstones.  Follow these instructions at home:  Medicines    Take over-the-counter and prescription medicines only as told by your health care provider.  If you were prescribed antibiotics, take them as told by your provider. Do not stop using the antibiotic even if you start to feel better.  Ask your provider if the medicine prescribed to you requires you to avoid driving or using machinery.  Eating and drinking    Drink enough fluid to keep your pee pale yellow. This is important during a gallbladder attack. Water and clear liquids are preferred.  Follow a healthy diet. This includes:  Reducing fatty foods, such as fried food and foods high in cholesterol.  Reducing refined carbohydrates, such as white bread and white rice.  Eating more fiber. Aim for foods such as almonds, fruit, and beans.  General instructions    Do not use any products that contain nicotine or tobacco. These products include cigarettes, chewing tobacco, and vaping devices, such as e-cigarettes. If you need help quitting, ask your provider.  Maintain a healthy weight.  Keep all follow-up visits. These may include seeing a specialist or a surgeon.  Where to find more information  [employer] [place] of [employer] and [employer]estive and Kidney Diseases: niddk.nih.gov  Contact a health care provider if:  You think you have had a gallbladder attack.  You have been diagnosed with silent gallstones and you develop indigestion or pain in your abdomen.  You have pain from a gallbladder attack that lasts for more than 2 hours.  You begin to have attacks more often.  You have nausea.  You have dark pee or light-colored poop.  Get help right away if:  You have pain in your abdomen that lasts for more than 5 hours or is getting worse.  You have a fever or chills.  You have vomiting that does not go away.  You develop jaundice.

## 2025-07-29 NOTE — ED PROVIDER NOTE - PHYSICAL EXAMINATION
Constitutional: NAD AAOx3  Eyes: EOMI, pupils equal  Head: Normocephalic atraumatic  Mouth: no airway obstruction  Cardiac: regular rate   Resp: Lungs CTAB  GI: Abd s/nt/nd  Neuro: CN2-12 intact  Skin: No rashes.  msk: ecchymosis to 5th metacarpal with ttp, full rom of all joints, ecchymoses over shin.

## 2025-08-11 ENCOUNTER — RX RENEWAL (OUTPATIENT)
Age: 85
End: 2025-08-11

## 2025-08-14 ENCOUNTER — APPOINTMENT (OUTPATIENT)
Dept: GASTROENTEROLOGY | Facility: CLINIC | Age: 85
End: 2025-08-14
Payer: MEDICARE

## 2025-08-14 VITALS
SYSTOLIC BLOOD PRESSURE: 124 MMHG | HEIGHT: 63 IN | DIASTOLIC BLOOD PRESSURE: 78 MMHG | WEIGHT: 170 LBS | BODY MASS INDEX: 30.12 KG/M2

## 2025-08-14 DIAGNOSIS — K59.00 CONSTIPATION, UNSPECIFIED: ICD-10-CM

## 2025-08-14 DIAGNOSIS — K80.20 CALCULUS OF GALLBLADDER W/OUT CHOLECYSTITIS W/OUT OBSTRUCTION: ICD-10-CM

## 2025-08-14 PROCEDURE — 99203 OFFICE O/P NEW LOW 30 MIN: CPT

## 2025-08-14 RX ORDER — ACETAMINOPHEN 500 MG/1
500 TABLET ORAL
Refills: 0 | Status: ACTIVE | COMMUNITY

## 2025-08-15 ENCOUNTER — RX RENEWAL (OUTPATIENT)
Age: 85
End: 2025-08-15

## 2025-08-18 ENCOUNTER — RX RENEWAL (OUTPATIENT)
Age: 85
End: 2025-08-18

## 2025-09-09 ENCOUNTER — APPOINTMENT (OUTPATIENT)
Dept: GASTROENTEROLOGY | Facility: CLINIC | Age: 85
End: 2025-09-09

## 2025-09-19 ENCOUNTER — APPOINTMENT (OUTPATIENT)
Dept: FAMILY MEDICINE | Facility: CLINIC | Age: 85
End: 2025-09-19
Payer: MEDICARE

## 2025-09-19 VITALS
SYSTOLIC BLOOD PRESSURE: 124 MMHG | OXYGEN SATURATION: 96 % | DIASTOLIC BLOOD PRESSURE: 76 MMHG | WEIGHT: 175.13 LBS | TEMPERATURE: 98.1 F | BODY MASS INDEX: 31.03 KG/M2 | HEIGHT: 63 IN | HEART RATE: 68 BPM

## 2025-09-19 DIAGNOSIS — G89.29 LOW BACK PAIN, UNSPECIFIED: ICD-10-CM

## 2025-09-19 DIAGNOSIS — M15.9 POLYOSTEOARTHRITIS, UNSPECIFIED: ICD-10-CM

## 2025-09-19 DIAGNOSIS — J44.9 CHRONIC OBSTRUCTIVE PULMONARY DISEASE, UNSPECIFIED: ICD-10-CM

## 2025-09-19 DIAGNOSIS — E78.00 PURE HYPERCHOLESTEROLEMIA, UNSPECIFIED: ICD-10-CM

## 2025-09-19 DIAGNOSIS — M85.80 OTHER SPECIFIED DISORDERS OF BONE DENSITY AND STRUCTURE, UNSPECIFIED SITE: ICD-10-CM

## 2025-09-19 DIAGNOSIS — R05.9 COUGH, UNSPECIFIED: ICD-10-CM

## 2025-09-19 DIAGNOSIS — M54.50 LOW BACK PAIN, UNSPECIFIED: ICD-10-CM

## 2025-09-19 DIAGNOSIS — I10 ESSENTIAL (PRIMARY) HYPERTENSION: ICD-10-CM

## 2025-09-19 DIAGNOSIS — Z00.00 ENCOUNTER FOR GENERAL ADULT MEDICAL EXAMINATION W/OUT ABNORMAL FINDINGS: ICD-10-CM

## 2025-09-19 DIAGNOSIS — Z86.69 PERSONAL HISTORY OF OTHER DISEASES OF THE NERVOUS SYSTEM AND SENSE ORGANS: ICD-10-CM

## 2025-09-19 DIAGNOSIS — K59.00 CONSTIPATION, UNSPECIFIED: ICD-10-CM

## 2025-09-19 PROCEDURE — G0439: CPT

## 2025-09-19 PROCEDURE — 36415 COLL VENOUS BLD VENIPUNCTURE: CPT

## 2025-09-20 DIAGNOSIS — R71.8 OTHER ABNORMALITY OF RED BLOOD CELLS: ICD-10-CM

## 2025-09-22 PROBLEM — R79.89 LOW VITAMIN B12 LEVEL: Status: ACTIVE | Noted: 2025-09-22

## 2025-09-22 LAB
FOLATE SERPL-MCNC: 7.9 NG/ML
VIT B12 SERPL-MCNC: 292 PG/ML